# Patient Record
Sex: FEMALE | Race: WHITE | NOT HISPANIC OR LATINO | Employment: UNEMPLOYED | URBAN - METROPOLITAN AREA
[De-identification: names, ages, dates, MRNs, and addresses within clinical notes are randomized per-mention and may not be internally consistent; named-entity substitution may affect disease eponyms.]

---

## 2021-10-29 ENCOUNTER — HOSPITAL ENCOUNTER (EMERGENCY)
Facility: CLINIC | Age: 31
Discharge: HOME | End: 2021-10-29
Attending: EMERGENCY MEDICINE | Admitting: EMERGENCY MEDICINE
Payer: COMMERCIAL

## 2021-10-29 VITALS
SYSTOLIC BLOOD PRESSURE: 158 MMHG | TEMPERATURE: 97.8 F | RESPIRATION RATE: 18 BRPM | DIASTOLIC BLOOD PRESSURE: 84 MMHG | OXYGEN SATURATION: 99 % | HEART RATE: 101 BPM

## 2021-10-29 DIAGNOSIS — F10.920 ALCOHOLIC INTOXICATION WITHOUT COMPLICATION (H): Primary | ICD-10-CM

## 2021-10-29 DIAGNOSIS — R10.84 ABDOMINAL PAIN, GENERALIZED: ICD-10-CM

## 2021-10-29 LAB
ALBUMIN SERPL-MCNC: 3.2 G/DL (ref 3.4–5)
ALCOHOL BREATH TEST: 0.29 (ref 0–0.01)
ALP SERPL-CCNC: 84 U/L (ref 40–150)
ALT SERPL W P-5'-P-CCNC: 17 U/L (ref 0–50)
AMPHETAMINES UR QL SCN: ABNORMAL
ANION GAP SERPL CALCULATED.3IONS-SCNC: 7 MMOL/L (ref 3–14)
AST SERPL W P-5'-P-CCNC: 12 U/L (ref 0–45)
BARBITURATES UR QL: ABNORMAL
BASOPHILS # BLD AUTO: 0 10E3/UL (ref 0–0.2)
BASOPHILS NFR BLD AUTO: 0 %
BENZODIAZ UR QL: ABNORMAL
BILIRUB SERPL-MCNC: 0.2 MG/DL (ref 0.2–1.3)
BUN SERPL-MCNC: 7 MG/DL (ref 7–30)
CALCIUM SERPL-MCNC: 8.3 MG/DL (ref 8.5–10.1)
CANNABINOIDS UR QL SCN: ABNORMAL
CHLORIDE BLD-SCNC: 117 MMOL/L (ref 94–109)
CO2 SERPL-SCNC: 20 MMOL/L (ref 20–32)
COCAINE UR QL: ABNORMAL
CREAT SERPL-MCNC: 0.61 MG/DL (ref 0.52–1.04)
EOSINOPHIL # BLD AUTO: 0.1 10E3/UL (ref 0–0.7)
EOSINOPHIL NFR BLD AUTO: 1 %
ERYTHROCYTE [DISTWIDTH] IN BLOOD BY AUTOMATED COUNT: 12.9 % (ref 10–15)
GFR SERPL CREATININE-BSD FRML MDRD: >90 ML/MIN/1.73M2
GLUCOSE BLD-MCNC: 90 MG/DL (ref 70–99)
HCG UR QL: NEGATIVE
HCT VFR BLD AUTO: 41.2 % (ref 35–47)
HGB BLD-MCNC: 13.8 G/DL (ref 11.7–15.7)
IMM GRANULOCYTES # BLD: 0 10E3/UL
IMM GRANULOCYTES NFR BLD: 1 %
LIPASE SERPL-CCNC: 311 U/L (ref 73–393)
LYMPHOCYTES # BLD AUTO: 1.7 10E3/UL (ref 0.8–5.3)
LYMPHOCYTES NFR BLD AUTO: 23 %
MAGNESIUM SERPL-MCNC: 2.5 MG/DL (ref 1.6–2.3)
MCH RBC QN AUTO: 30.2 PG (ref 26.5–33)
MCHC RBC AUTO-ENTMCNC: 33.5 G/DL (ref 31.5–36.5)
MCV RBC AUTO: 90 FL (ref 78–100)
MONOCYTES # BLD AUTO: 0.2 10E3/UL (ref 0–1.3)
MONOCYTES NFR BLD AUTO: 3 %
NEUTROPHILS # BLD AUTO: 5.6 10E3/UL (ref 1.6–8.3)
NEUTROPHILS NFR BLD AUTO: 72 %
NRBC # BLD AUTO: 0 10E3/UL
NRBC BLD AUTO-RTO: 0 /100
OPIATES UR QL SCN: ABNORMAL
PLATELET # BLD AUTO: 287 10E3/UL (ref 150–450)
POTASSIUM BLD-SCNC: 3.5 MMOL/L (ref 3.4–5.3)
PROT SERPL-MCNC: 7.3 G/DL (ref 6.8–8.8)
RBC # BLD AUTO: 4.57 10E6/UL (ref 3.8–5.2)
SODIUM SERPL-SCNC: 144 MMOL/L (ref 133–144)
WBC # BLD AUTO: 7.7 10E3/UL (ref 4–11)

## 2021-10-29 PROCEDURE — 83690 ASSAY OF LIPASE: CPT | Performed by: STUDENT IN AN ORGANIZED HEALTH CARE EDUCATION/TRAINING PROGRAM

## 2021-10-29 PROCEDURE — 96361 HYDRATE IV INFUSION ADD-ON: CPT | Performed by: EMERGENCY MEDICINE

## 2021-10-29 PROCEDURE — 82040 ASSAY OF SERUM ALBUMIN: CPT | Performed by: STUDENT IN AN ORGANIZED HEALTH CARE EDUCATION/TRAINING PROGRAM

## 2021-10-29 PROCEDURE — 99283 EMERGENCY DEPT VISIT LOW MDM: CPT | Mod: 25 | Performed by: EMERGENCY MEDICINE

## 2021-10-29 PROCEDURE — 258N000003 HC RX IP 258 OP 636: Performed by: STUDENT IN AN ORGANIZED HEALTH CARE EDUCATION/TRAINING PROGRAM

## 2021-10-29 PROCEDURE — 96360 HYDRATION IV INFUSION INIT: CPT | Performed by: EMERGENCY MEDICINE

## 2021-10-29 PROCEDURE — 82075 ASSAY OF BREATH ETHANOL: CPT | Performed by: EMERGENCY MEDICINE

## 2021-10-29 PROCEDURE — 36415 COLL VENOUS BLD VENIPUNCTURE: CPT | Performed by: STUDENT IN AN ORGANIZED HEALTH CARE EDUCATION/TRAINING PROGRAM

## 2021-10-29 PROCEDURE — 99284 EMERGENCY DEPT VISIT MOD MDM: CPT | Performed by: EMERGENCY MEDICINE

## 2021-10-29 PROCEDURE — 80307 DRUG TEST PRSMV CHEM ANLYZR: CPT | Performed by: EMERGENCY MEDICINE

## 2021-10-29 PROCEDURE — 250N000009 HC RX 250: Performed by: STUDENT IN AN ORGANIZED HEALTH CARE EDUCATION/TRAINING PROGRAM

## 2021-10-29 PROCEDURE — 250N000011 HC RX IP 250 OP 636: Performed by: STUDENT IN AN ORGANIZED HEALTH CARE EDUCATION/TRAINING PROGRAM

## 2021-10-29 PROCEDURE — 83735 ASSAY OF MAGNESIUM: CPT | Performed by: STUDENT IN AN ORGANIZED HEALTH CARE EDUCATION/TRAINING PROGRAM

## 2021-10-29 PROCEDURE — 85025 COMPLETE CBC W/AUTO DIFF WBC: CPT | Performed by: STUDENT IN AN ORGANIZED HEALTH CARE EDUCATION/TRAINING PROGRAM

## 2021-10-29 PROCEDURE — 81025 URINE PREGNANCY TEST: CPT | Performed by: EMERGENCY MEDICINE

## 2021-10-29 PROCEDURE — 250N000013 HC RX MED GY IP 250 OP 250 PS 637: Performed by: STUDENT IN AN ORGANIZED HEALTH CARE EDUCATION/TRAINING PROGRAM

## 2021-10-29 RX ORDER — DIAZEPAM 5 MG
5-20 TABLET ORAL EVERY 30 MIN PRN
Status: DISCONTINUED | OUTPATIENT
Start: 2021-10-29 | End: 2021-10-30 | Stop reason: HOSPADM

## 2021-10-29 RX ORDER — ONDANSETRON 4 MG/1
4 TABLET, ORALLY DISINTEGRATING ORAL ONCE
Status: COMPLETED | OUTPATIENT
Start: 2021-10-29 | End: 2021-10-29

## 2021-10-29 RX ORDER — ACETAMINOPHEN 325 MG/1
650 TABLET ORAL ONCE
Status: COMPLETED | OUTPATIENT
Start: 2021-10-29 | End: 2021-10-29

## 2021-10-29 RX ADMIN — SODIUM CHLORIDE, POTASSIUM CHLORIDE, SODIUM LACTATE AND CALCIUM CHLORIDE 1000 ML: 600; 310; 30; 20 INJECTION, SOLUTION INTRAVENOUS at 19:23

## 2021-10-29 RX ADMIN — LIDOCAINE HYDROCHLORIDE 30 ML: 20 SOLUTION ORAL; TOPICAL at 19:23

## 2021-10-29 RX ADMIN — ACETAMINOPHEN 650 MG: 325 TABLET, FILM COATED ORAL at 18:45

## 2021-10-29 RX ADMIN — ONDANSETRON 4 MG: 4 TABLET, ORALLY DISINTEGRATING ORAL at 18:45

## 2021-10-29 NOTE — ED NOTES
Bed: HW01  Expected date:   Expected time:   Means of arrival:   Comments:  N715  31y F  Etoh, on hold

## 2021-10-29 NOTE — ED PROVIDER NOTES
"ED Provider Note  [facility]      History     Chief Complaint   Patient presents with     Alcohol Intoxication     HPI  Hilaria Mcnulty is a 31 year old female w/ a PMHx of MONICA and chronic abdominal pain s/p appendectomy in 2015 w/ resultant episodes of SBO who presents to the ED w/ a 2 day history of abdominal pain and alcohol intoxication.    States that her pain is a \"9/10\" stabbing in the lower abdomen, w/ associated pain w/ urination when she bears down. She endorses emesis x1 NB, NB around noon today; and her last BM last night that was \"painful, but not bloody\".     Patient presently denies EtOH usage. Reports only medicine she took is Advil for pain, and her prescribed Xanax / Paxil for anxiety. Patient does not immediately recall how she was brought here.     Past Medical History  History reviewed. No pertinent past medical history.  History reviewed. No pertinent surgical history.  acetaminophen 650 MG TABS  ALPRAZolam (XANAX PO)  gabapentin (NEURONTIN) 300 MG capsule  HYDROcodone-acetaminophen (NORCO) 5-325 MG per tablet  lactulose (CHRONULAC) 10 GM/15ML solution  lidocaine (LIDODERM) 5 % patch  ondansetron (ZOFRAN-ODT) 4 MG disintegrating tablet  PARoxetine (PAXIL) 30 MG tablet  polyethylene glycol (MIRALAX/GLYCOLAX) packet  senna-docusate (SENOKOT-S;PERICOLACE) 8.6-50 MG per tablet      No Known Allergies  Family History  History reviewed. No pertinent family history.  Social History   Social History     Tobacco Use     Smoking status: Never Smoker     Smokeless tobacco: Never Used   Substance Use Topics     Alcohol use: Yes     Drug use: Not Currently      Past medical history, past surgical history, medications, allergies, family history, and social history were reviewed with the patient. No additional pertinent items.       Review of Systems  A complete review of systems was performed with pertinent positives and negatives noted in the HPI, and all other systems " "negative.    Physical Exam   BP: 107/63  Pulse: 101  Temp: 97.5  F (36.4  C)  Resp: 20  SpO2: 95 %  Physical Exam  Constitutional:       General: She is not in acute distress.     Appearance: She is not ill-appearing or toxic-appearing.   Eyes:      General: Visual field deficit (Limited convergence. ) present.   Cardiovascular:      Rate and Rhythm: Normal rate and regular rhythm.      Pulses: Normal pulses.      Heart sounds: Normal heart sounds. No murmur heard.   No friction rub. No gallop.    Pulmonary:      Effort: Pulmonary effort is normal. No respiratory distress.      Breath sounds: Normal breath sounds. No wheezing or rales.   Abdominal:      General: Bowel sounds are normal. There is no distension.      Palpations: Abdomen is soft.      Tenderness: There is abdominal tenderness (reports stabbing pain on midabdomen / suprapubic palpation. ). There is no right CVA tenderness, left CVA tenderness, guarding or rebound. Negative signs include Rovsing's sign.      Hernia: No hernia is present.          Comments: Of note patient does not wince or guard against palpation and is able to talk w/ examiner w/ distraction.    Neurological:      Mental Status: She is alert. She is confused.       Psych  Appearance: Well groomed / hygenic. Dressed in back top and leggings. Tattoo on neck and right forearm. Wearing silver hoops and mask. Glitter noted on face.   Behavior: Attempts to answer questions but is often at a loss of thought, answers slowly w/ slight slurring, doesn't maintain consistent eye contact w/ interviewer, fixates on abdominal pain that is \"severe\", but is able to speak to examiner normally. Patient states pain on abdominal exam, but there is no guard, and with distraction patient is not actively reporting pain.    Speech: Slightly slurred, but tangible and understandable. Patience and tone are normal.   Mood: \"I am in pain\"  Emotive: Restricted, limited verbal emotive behaviors, some facial emotive " behaviors.   Psychomotor: Mild psychomotor retardation present   Thought Process: Logical, A->B direct, but is slow cognitively  Thought Content: Denies SI/HI. Not responding to internal stimuli.   Insight: Poor to fair, considering that she recognizes her chronic abdominal pain history and ties in her surgery and adhesion hx, but did deny EtOh usage.   Judgement: Poor to fair, states she sought treatment for abdominal pain, denies EtOH use yet has elevated breathalyzer values 3x upper limit.   Cognitive and Neuro: AAOx1 (self only). Short term memory impaired. Failed three word recall. Failed WORLD test. Demonstrated concrete thinking against abstract concepts. Diminsihed convergence noticed on accomodation/convergence ocular exam.     ED Course      Procedures            Results for orders placed or performed during the hospital encounter of 10/29/21   HCG qualitative urine     Status: Normal   Result Value Ref Range    hCG Urine Qualitative Negative Negative   Drug abuse screen 1 urine (ED)     Status: Abnormal   Result Value Ref Range    Amphetamines Urine Screen Negative Screen Negative    Barbiturates Urine Screen Negative Screen Negative    Benzodiazepines Urine Screen Positive (A) Screen Negative    Cannabinoids Urine Screen Negative Screen Negative    Cocaine Urine Screen Negative Screen Negative    Opiates Urine Screen Negative Screen Negative   Comprehensive metabolic panel     Status: Abnormal   Result Value Ref Range    Sodium 144 133 - 144 mmol/L    Potassium 3.5 3.4 - 5.3 mmol/L    Chloride 117 (H) 94 - 109 mmol/L    Carbon Dioxide (CO2) 20 20 - 32 mmol/L    Anion Gap 7 3 - 14 mmol/L    Urea Nitrogen 7 7 - 30 mg/dL    Creatinine 0.61 0.52 - 1.04 mg/dL    Calcium 8.3 (L) 8.5 - 10.1 mg/dL    Glucose 90 70 - 99 mg/dL    Alkaline Phosphatase 84 40 - 150 U/L    AST 12 0 - 45 U/L    ALT 17 0 - 50 U/L    Protein Total 7.3 6.8 - 8.8 g/dL    Albumin 3.2 (L) 3.4 - 5.0 g/dL    Bilirubin Total 0.2 0.2 - 1.3 mg/dL     GFR Estimate >90 >60 mL/min/1.73m2   Magnesium     Status: Abnormal   Result Value Ref Range    Magnesium 2.5 (H) 1.6 - 2.3 mg/dL   Lipase     Status: Normal   Result Value Ref Range    Lipase 311 73 - 393 U/L   CBC with platelets and differential     Status: None   Result Value Ref Range    WBC Count 7.7 4.0 - 11.0 10e3/uL    RBC Count 4.57 3.80 - 5.20 10e6/uL    Hemoglobin 13.8 11.7 - 15.7 g/dL    Hematocrit 41.2 35.0 - 47.0 %    MCV 90 78 - 100 fL    MCH 30.2 26.5 - 33.0 pg    MCHC 33.5 31.5 - 36.5 g/dL    RDW 12.9 10.0 - 15.0 %    Platelet Count 287 150 - 450 10e3/uL    % Neutrophils 72 %    % Lymphocytes 23 %    % Monocytes 3 %    % Eosinophils 1 %    % Basophils 0 %    % Immature Granulocytes 1 %    NRBCs per 100 WBC 0 <1 /100    Absolute Neutrophils 5.6 1.6 - 8.3 10e3/uL    Absolute Lymphocytes 1.7 0.8 - 5.3 10e3/uL    Absolute Monocytes 0.2 0.0 - 1.3 10e3/uL    Absolute Eosinophils 0.1 0.0 - 0.7 10e3/uL    Absolute Basophils 0.0 0.0 - 0.2 10e3/uL    Absolute Immature Granulocytes 0.0 <=0.0 10e3/uL    Absolute NRBCs 0.0 10e3/uL   Alcohol breath test POCT     Status: Abnormal   Result Value Ref Range    Alcohol Breath Test 0.288 (A) 0.00 - 0.01   Urine Drugs of Abuse Screen     Status: Abnormal    Narrative    The following orders were created for panel order Urine Drugs of Abuse Screen.  Procedure                               Abnormality         Status                     ---------                               -----------         ------                     Drug abuse screen 1 urin...[[id number]]  Abnormal            Final result                 Please view results for these tests on the individual orders.   CBC with platelets differential     Status: None    Narrative    The following orders were created for panel order CBC with platelets differential.  Procedure                               Abnormality         Status                     ---------                               -----------         ------   "                   CBC with platelets and d...[[id number]]                      Final result                 Please view results for these tests on the individual orders.     Medications   diazepam (VALIUM) tablet 5-20 mg (has no administration in time range)   lactated ringers BOLUS 1,000 mL (0 mLs Intravenous Stopped 10/29/21 2128)   ondansetron (ZOFRAN-ODT) ODT tab 4 mg (4 mg Oral Given 10/29/21 1845)   acetaminophen (TYLENOL) tablet 650 mg (650 mg Oral Given 10/29/21 1845)   lidocaine (XYLOCAINE) 2 % 15 mL, alum & mag hydroxide-simethicone (MAALOX) 15 mL GI Cocktail (30 mLs Oral Given 10/29/21 1923)        Assessments & Plan (with Medical Decision Making)   Hilaria Mcnulty is a 31 year old female w/ a PMHx of MONICA and chronic abdominal pain s/p appendectomy in 2015 w/ resultant episodes of SBO who presents to the ED w/ a 2 day history of abdominal pain and likely day alcohol intoxication. Denies EtOH use on admission, but has elevated levels of EtOH breath test - 0.288.    bHCG is negative. CBC is unremarkable. CMP remarkable for mild hyperchloremia 117. Magnesium was marginally elevated at 2.5. Lipase wnl.     LR 1000ml bolus, acetaminophen, and GI cocktail were initially administered in response to abdominal pain report in the setting of emesis.     Patient's symptoms improved during her ED course. She became more cognitive and alert during course and was able to admit she did drink today - stating last drink around 11AM of a \"mixed drink\". States she drank because her \"brother told me to get tattoos with him\" and \"got me drunk\". She states her brother called EMS to bring her in. States she wishes to leave now, and would call her brother or grandfather to pick to her up.     Patient did not require administration of diazepam during admit. Did not experience seizures / agitation. Repeat abdominal exam was benign and patient reported no pain. Patient reports an appointment on Nov 11 w/ a surgeon for abdominal " adhesion lysis. Patient was offered to meet with social work to engage and connect w/ support services, but patient declined at this time - citing family as supportive, and her new job as something she is looking forward to. Patient adamantly denies SI and HI.     Patient is stable and safe for home discharge. Patient will secure ride from family member.     Patient unable to secure transport from family at this time. Discussed options of staying overnight until family can pick her up. Patient preferred leaving now. Patient asked about calling taxi / uber for self. Patient is low risk for entering EtOH withdrawal as she has no EtOH hx (per her report). She is clinically sober. If patient is able to secure a ride (family, taxi) that can bring her home, this is amenable.     Discharge Medication List as of 10/29/2021 11:19 PM          Final diagnoses:   Alcoholic intoxication without complication (H)   Abdominal pain, generalized       --  Patient was discussed w/ staff physician Dr. Judah Olmos,   Family Medicine PGY1  [facility] EMERGENCY DEPARTMENT  10/29/2021      --    ED Attending Physician Attestation    I Jose Raul Johnson MD, cared for this patient with the Resident. I have performed a history and physical examination of the patient and discussed management with the resident. I reviewed the resident's documentation above and agree with the documented findings and plan of care.    Summary of HPI, PE, ED Course   Patient is a 31 year old female evaluated in the emergency department for alcohol intoxication and also complains of abdominal pain. Exam notable for benign abdomen and alcohol intoxication exam, stable vital signs ED course notable for normal laboratory studies including normal white count, not pregnant, normal LFTs.  She was monitored with serial neuro exams after which she clinically cleared, denies any abdominal pain notes that she is chronic abdominal pain in nature but  denies anything new or unusual.  Denies any new or acute symptoms.  Denies suicidal or homicidal ideation but does endorse significant anxiety which is led to her drinking tonight.  Offered mental health assessment the patient declined.  After the completion of care in the emergency department, the patient was discharged.    Critical Care & Procedures  Not applicable.    Medical Decision Making  The medical record was reviewed and interpreted.  Current labs reviewed and interpreted.      Jose Raul Johnson MD  Emergency Medicine          Jose Raul Johnson MD  10/30/21 0015

## 2021-10-29 NOTE — ED TRIAGE NOTES
Pt brought in by ambulance. JARROD of 0.288 upon arrival. Pt also reports abdominal pain, rates as a 10/10.

## 2021-10-30 NOTE — DISCHARGE INSTRUCTIONS
You were seen for abdominal pain and alcohol intoxication.     We discussed some resources for you, as you have been going thru a number of stressors recently. If you feel you need immediate help for crisis, you can text [place] to [phone].

## 2023-10-06 ENCOUNTER — HOSPITAL ENCOUNTER (EMERGENCY)
Facility: CLINIC | Age: 33
Discharge: HOME | End: 2023-10-06
Attending: FAMILY MEDICINE | Admitting: PSYCHIATRY & NEUROLOGY
Payer: COMMERCIAL

## 2023-10-06 ENCOUNTER — TELEPHONE (OUTPATIENT)
Dept: BEHAVIORAL HEALTH | Facility: CLINIC | Age: 33
End: 2023-10-06

## 2023-10-06 VITALS
HEART RATE: 92 BPM | HEIGHT: 65 IN | SYSTOLIC BLOOD PRESSURE: 131 MMHG | TEMPERATURE: 98.2 F | WEIGHT: 180 LBS | DIASTOLIC BLOOD PRESSURE: 87 MMHG | RESPIRATION RATE: 16 BRPM | OXYGEN SATURATION: 97 % | BODY MASS INDEX: 29.99 KG/M2

## 2023-10-06 DIAGNOSIS — F10.230 ALCOHOL DEPENDENCE WITH UNCOMPLICATED WITHDRAWAL (H): ICD-10-CM

## 2023-10-06 DIAGNOSIS — F13.20 BENZODIAZEPINE DEPENDENCE (H): ICD-10-CM

## 2023-10-06 DIAGNOSIS — F41.1 GENERALIZED ANXIETY DISORDER: ICD-10-CM

## 2023-10-06 PROBLEM — F33.9 RECURRENT MAJOR DEPRESSIVE DISORDER (H): Status: ACTIVE | Noted: 2023-10-06

## 2023-10-06 LAB
ALBUMIN SERPL BCG-MCNC: 4.2 G/DL (ref 3.5–5.2)
ALP SERPL-CCNC: 107 U/L (ref 35–104)
ALT SERPL W P-5'-P-CCNC: 13 U/L (ref 0–50)
AMPHETAMINES UR QL SCN: ABNORMAL
ANION GAP SERPL CALCULATED.3IONS-SCNC: 12 MMOL/L (ref 7–15)
AST SERPL W P-5'-P-CCNC: 14 U/L (ref 0–45)
BARBITURATES UR QL SCN: ABNORMAL
BASO+EOS+MONOS # BLD AUTO: ABNORMAL 10*3/UL
BASO+EOS+MONOS NFR BLD AUTO: ABNORMAL %
BASOPHILS # BLD AUTO: 0 10E3/UL (ref 0–0.2)
BASOPHILS NFR BLD AUTO: 0 %
BENZODIAZ UR QL SCN: ABNORMAL
BILIRUB SERPL-MCNC: 0.2 MG/DL
BUN SERPL-MCNC: 8 MG/DL (ref 6–20)
BZE UR QL SCN: ABNORMAL
CALCIUM SERPL-MCNC: 8.9 MG/DL (ref 8.6–10)
CANNABINOIDS UR QL SCN: ABNORMAL
CHLORIDE SERPL-SCNC: 104 MMOL/L (ref 98–107)
CREAT SERPL-MCNC: 0.64 MG/DL (ref 0.51–0.95)
DEPRECATED HCO3 PLAS-SCNC: 19 MMOL/L (ref 22–29)
EGFRCR SERPLBLD CKD-EPI 2021: >90 ML/MIN/1.73M2
EOSINOPHIL # BLD AUTO: 0 10E3/UL (ref 0–0.7)
EOSINOPHIL NFR BLD AUTO: 0 %
ERYTHROCYTE [DISTWIDTH] IN BLOOD BY AUTOMATED COUNT: 13.3 % (ref 10–15)
ETHANOL SERPL-MCNC: <0.01 G/DL
FENTANYL UR QL: ABNORMAL
GLUCOSE SERPL-MCNC: 93 MG/DL (ref 70–99)
HCG UR QL: NEGATIVE
HCT VFR BLD AUTO: 39.7 % (ref 35–47)
HGB BLD-MCNC: 13.1 G/DL (ref 11.7–15.7)
IMM GRANULOCYTES # BLD: 0 10E3/UL
IMM GRANULOCYTES NFR BLD: 0 %
LYMPHOCYTES # BLD AUTO: 1.7 10E3/UL (ref 0.8–5.3)
LYMPHOCYTES NFR BLD AUTO: 15 %
MCH RBC QN AUTO: 30.3 PG (ref 26.5–33)
MCHC RBC AUTO-ENTMCNC: 33 G/DL (ref 31.5–36.5)
MCV RBC AUTO: 92 FL (ref 78–100)
MONOCYTES # BLD AUTO: 0.5 10E3/UL (ref 0–1.3)
MONOCYTES NFR BLD AUTO: 4 %
NEUTROPHILS # BLD AUTO: 9.3 10E3/UL (ref 1.6–8.3)
NEUTROPHILS NFR BLD AUTO: 81 %
NRBC # BLD AUTO: 0 10E3/UL
NRBC BLD AUTO-RTO: 0 /100
OPIATES UR QL SCN: ABNORMAL
PCP QUAL URINE (ROCHE): ABNORMAL
PLATELET # BLD AUTO: 259 10E3/UL (ref 150–450)
POTASSIUM SERPL-SCNC: 4.3 MMOL/L (ref 3.4–5.3)
PROT SERPL-MCNC: 6.9 G/DL (ref 6.4–8.3)
RBC # BLD AUTO: 4.32 10E6/UL (ref 3.8–5.2)
SODIUM SERPL-SCNC: 135 MMOL/L (ref 135–145)
WBC # BLD AUTO: 11.5 10E3/UL (ref 4–11)

## 2023-10-06 PROCEDURE — 99283 EMERGENCY DEPT VISIT LOW MDM: CPT | Performed by: FAMILY MEDICINE

## 2023-10-06 PROCEDURE — 81025 URINE PREGNANCY TEST: CPT | Performed by: FAMILY MEDICINE

## 2023-10-06 PROCEDURE — 36415 COLL VENOUS BLD VENIPUNCTURE: CPT | Performed by: FAMILY MEDICINE

## 2023-10-06 PROCEDURE — 82077 ASSAY SPEC XCP UR&BREATH IA: CPT | Performed by: FAMILY MEDICINE

## 2023-10-06 PROCEDURE — 80307 DRUG TEST PRSMV CHEM ANLYZR: CPT | Performed by: FAMILY MEDICINE

## 2023-10-06 PROCEDURE — 99285 EMERGENCY DEPT VISIT HI MDM: CPT | Performed by: FAMILY MEDICINE

## 2023-10-06 PROCEDURE — 85004 AUTOMATED DIFF WBC COUNT: CPT | Performed by: FAMILY MEDICINE

## 2023-10-06 PROCEDURE — 99284 EMERGENCY DEPT VISIT MOD MDM: CPT | Performed by: PSYCHIATRY & NEUROLOGY

## 2023-10-06 PROCEDURE — 250N000013 HC RX MED GY IP 250 OP 250 PS 637: Performed by: EMERGENCY MEDICINE

## 2023-10-06 PROCEDURE — 80053 COMPREHEN METABOLIC PANEL: CPT | Performed by: FAMILY MEDICINE

## 2023-10-06 RX ORDER — LOPERAMIDE HCL 2 MG
2 CAPSULE ORAL 4 TIMES DAILY PRN
COMMUNITY
End: 2024-04-24

## 2023-10-06 RX ORDER — ALPRAZOLAM 1 MG
1 TABLET ORAL 3 TIMES DAILY
COMMUNITY

## 2023-10-06 RX ORDER — HYDROXYZINE HYDROCHLORIDE 50 MG/1
50 TABLET, FILM COATED ORAL ONCE
Status: COMPLETED | OUTPATIENT
Start: 2023-10-06 | End: 2023-10-06

## 2023-10-06 RX ORDER — LOPERAMIDE HCL 2 MG
2 CAPSULE ORAL 4 TIMES DAILY PRN
Status: CANCELLED | OUTPATIENT
Start: 2023-10-06

## 2023-10-06 RX ORDER — PHENOBARBITAL 64.8 MG/1
64.8 TABLET ORAL 3 TIMES DAILY
Status: CANCELLED | OUTPATIENT
Start: 2023-10-06

## 2023-10-06 RX ORDER — MAGNESIUM HYDROXIDE/ALUMINUM HYDROXICE/SIMETHICONE 120; 1200; 1200 MG/30ML; MG/30ML; MG/30ML
30 SUSPENSION ORAL EVERY 4 HOURS PRN
Status: CANCELLED | OUTPATIENT
Start: 2023-10-06

## 2023-10-06 RX ORDER — PAROXETINE 40 MG/1
40 TABLET, FILM COATED ORAL EVERY MORNING
Status: CANCELLED | OUTPATIENT
Start: 2023-10-07

## 2023-10-06 RX ORDER — PAROXETINE 20 MG/1
40 TABLET, FILM COATED ORAL EVERY MORNING
COMMUNITY

## 2023-10-06 RX ORDER — DROSPIRENONE AND ETHINYL ESTRADIOL 0.02-3(28)
1 KIT ORAL DAILY
COMMUNITY

## 2023-10-06 RX ORDER — AMOXICILLIN 250 MG
1 CAPSULE ORAL 2 TIMES DAILY PRN
Status: CANCELLED | OUTPATIENT
Start: 2023-10-06

## 2023-10-06 RX ORDER — ONDANSETRON 4 MG/1
4 TABLET, ORALLY DISINTEGRATING ORAL EVERY 6 HOURS PRN
Status: CANCELLED | OUTPATIENT
Start: 2023-10-06

## 2023-10-06 RX ORDER — ACETAMINOPHEN 500 MG
500-1000 TABLET ORAL EVERY 6 HOURS PRN
Status: CANCELLED | OUTPATIENT
Start: 2023-10-06

## 2023-10-06 RX ORDER — MULTIPLE VITAMINS W/ MINERALS TAB 9MG-400MCG
1 TAB ORAL DAILY
Status: CANCELLED | OUTPATIENT
Start: 2023-10-06

## 2023-10-06 RX ORDER — PHENOBARBITAL 32.4 MG/1
32.4 TABLET ORAL
Status: DISCONTINUED | OUTPATIENT
Start: 2023-10-06 | End: 2023-10-07 | Stop reason: HOSPADM

## 2023-10-06 RX ORDER — ACETAMINOPHEN 500 MG
500-1000 TABLET ORAL EVERY 6 HOURS PRN
COMMUNITY
End: 2024-04-24

## 2023-10-06 RX ORDER — FOLIC ACID 1 MG/1
1 TABLET ORAL DAILY
Status: CANCELLED | OUTPATIENT
Start: 2023-10-06

## 2023-10-06 RX ORDER — DROSPIRENONE AND ETHINYL ESTRADIOL 0.02-3(28)
1 KIT ORAL DAILY
Status: CANCELLED | OUTPATIENT
Start: 2023-10-06

## 2023-10-06 RX ORDER — DIAZEPAM 5 MG
5-20 TABLET ORAL EVERY 30 MIN PRN
Status: CANCELLED | OUTPATIENT
Start: 2023-10-06

## 2023-10-06 RX ORDER — ATENOLOL 50 MG/1
50 TABLET ORAL DAILY PRN
Status: CANCELLED | OUTPATIENT
Start: 2023-10-06

## 2023-10-06 RX ADMIN — HYDROXYZINE HYDROCHLORIDE 50 MG: 50 TABLET, FILM COATED ORAL at 20:03

## 2023-10-06 ASSESSMENT — ACTIVITIES OF DAILY LIVING (ADL)
ADLS_ACUITY_SCORE: 35
ADLS_ACUITY_SCORE: 35
ADLS_ACUITY_SCORE: 33
ADLS_ACUITY_SCORE: 35

## 2023-10-06 NOTE — DISCHARGE INSTRUCTIONS
Aftercare Plan  If I am feeling unsafe or I am in a crisis, I will:   Contact my established care providers   Call the National Suicide Prevention Lifeline: 311.740.1187   Go to the nearest emergency room   Call 911     Warning signs that I or other people might notice when a crisis is developing for me:     I am having increasing suicidal thoughts that turn to plans with intent or means  I am having additional urges to self-harm    My emotions are of hopelessness; feeling like there's no way out.  Rage or anger.  Engaging in risky activities without thinking  Withdrawing from family/friends  Dramatic mood swings  Drastic personality changes   Use of alcohol or drugs  Postings on social media  Neglect of personal hygiene or cares     Things I am able to do on my own to cope or help me feel better:    Spending quality time with loved ones  Staying hydrated  Eating balanced meals  Going for a walk every day  Take care of daily responsibilities/needs  Focus on positive self-talk vs negative self-talk    Things that I am able to do with others to cope or help me better:   Exercise  Music  Deep breathing  Meditations  Journal  Self-regulate  Self check-in  Ask for help    Things I can use or do for distraction:   Reach out to/spend time with family, friends  Shower  Exercise  Chores or do a project  Listen to music  Watch movie/TV  Listening to music  Journaling  Reading a book  Meditating  Call a friend    Changes I can make to support my mental health and wellness:    -I will abstain from all mood altering chemicals not currently prescribed to me   -I will attend scheduled mental health therapy and psychiatric appointments and follow all   recommendations  -I will commit to 30 minutes of self care daily - this can be as simple as taking a shower, going for a   walk, cooking a meal, read, writing, etc  -I will practice square breathing when I begin to feel anxious - in breath through the nose for the count   of 4 and  the first line on the square. Out breath through the mouth for the count of 4 for the second line   of the square. Repeat to complete the square. Repeat the square as many times as needed.  - I will use distraction skills of: going for walks, watching TV, spending time outside, calling a friend or   family member  -Use community resources, including [employer] numbers, [facility] crisis and support meetings  -Maintain a daily schedule/routine  -Practice deep breathing skills  -Download a meditation pedro and spend 15-20 minutes per day mediating/relaxing. Some apps to   download include: Calm, Headspace and Insight Timer. All 3 of these apps have free version    Reduce Extreme Emotion  QUICKLY:  Changing Your Body Chemistry      T:  Change your body Temperature to change your autonomic nervous system   Use Ice Water to calm yourself down FAST   Put your face in a bowl of ice water (this is the best way; have the person keep his/her face in ice water for 30-45 seconds - initial research is showing that the longer s/he can hold her/his face in the water, the better the response), or   Splash ice water on your face, or hold an ice pack on your face      I:  Intensely exercise to calm down a body revved up by emotion   Examples: running, walking fast, jumping, playing basketball, weight lifting, swimming, calisthenics, etc.   Engage in exercises that DO NOT include violent behaviors. Exercises that utilize violent behaviors tend to function as  behavioral rehearsal,  and rather than calming the person down, may actually  rev  the person up more, increasing the likelihood of violence, and lessening the likelihood that they will  burn off  energy     P:  Progressively relax your muscles   Starting with your hands, moving to your forearms, upper arms, shoulders, neck, forehead, eyes, cheeks and lips, tongue and teeth, chest, upper back, stomach, buttocks, thighs, calves, ankles, feet   Tense (10 seconds,   of the way), then relax  each muscle (all the way)   Notice the tension   Notice the difference when relaxed (by tensing first, and then relaxing, you are able to get a more thorough relaxation than by simply relaxing)      P: Paced breathing to relax   The standard technique is to begin with counting the number of steps one takes for a typical inhale, then counting the steps one takes for a typical exhale, and then lengthening the amount of steps for the exhalation by one or two steps.  OR  Repeat this pattern for 1-2 minutes  Inhale for four (4) seconds   Exhale for six (6) to eight (8) seconds   Research demonstrated that one can change one's overall level of anxiety by doing this exercise for even a few minutes per day      People in my life that I can ask for help:   Family  Friends  Providers    Your county has a mental health crisis team you can call 24/7:   [facility] Crisis Line Number: [phone]  [facility] Mental Health Crisis: [phone] - Call the crisis line for immediate mental health support, 24 hours a day.   [facility] Crisis Line Number: [phone]  [facility] Crisis Line Number: [phone]  [facility] Crisis Line Number: [phone]   [facility] Crisis Line Number: [phone]  [place]: [phone]  [place]: [phone]  [facility] Crisis Number: [phone]  [facility] Crisis: [phone]  [facility] Crisis: [phone]    Other things that are important when I'm in crisis:   Ask for help    Additional resources and information:     Mental Health Apps  My3  https://myAvaamopp.org/    VirtualHopeBox  https://[email or web address]/apps/virtual-hope-box/       Professionals or Agencies I Can Contact During A Crisis:       Crisis Lines  Call or Text 988 - National Suicide and Crisis Lifeline    Crisis Text Line  Text 885044  You will be connected with a trained live crisis counselor to provide support.    [employer] (LGBTQ Youth  "Crisis Line)  5.435.216.0122  text START to 577-114    National [place] on Mental Illness (SEBASTIEN)  680.875.6432 or 6.801.SEBASTIEN.HELPS    National Suicide Prevention Lifeline at 8-129-087-JOFP (9471)     Throughout  [place]: call **CRISIS (**631843)     Crisis Text Line: is available for free, 24/7 by texting [place] to 987060    Community Resources  Fast Tracker  Linking people to mental health and substance use disorder resources  SeniorLiving.Net.Errplane     [place] Mental Health Warm Line  Peer to peer support  Monday thru Saturday, 12 pm to 10 pm  295.093.8876 or 1.218.313.4619  Text \"Support\" to 80497     National [place] on Mental Illness (www.[place].sebastien.org): 220.245.3006 or 172-720-5750     [facility] Phone (free remote counseling): [phone] Web address:   https://[employer].org/     www.PlanStan (filter for insurance, gender preference, etc.)    [facility]   [phone]  Intake appointment will be virtual, following appointments can be in person or virtual.   **IMMEDIATE OPENINGS**    [facility]  [phone]  *offers individual therapy, medication management and Mental Health Case Workers; can self refer    [facility]  [phone]  *Immediate Openings    [facility]  [phone]  *Immediate Openings    [facility]  [phone]  *Immediate Openings    Please follow up with scheduled providers to ensure all necessary paperwork is filled out prior to your   scheduled telehealth appointments.     Coordinators from [facility] will be calling within two business days to ensure   that you have the resources you may need or provide assistance with scheduling (Phone number: [phone].).    Remember: give the referrals 3 sessions prior to calling it quits. Do you trust them? Do you feel   understood? Do you think they can help? Check in with yourself after each session    Please reach " out to the [facility]([phone]) regarding further mental health appointment needs for this emergency department visit.    [facility] SCHEDULING:  Today you were seen by a licensed mental health professional through [facility], [facility] ([facility])  for a crisis assessment in the Emergency Department at [facility].  It is recommended that you follow up with your estabished providers (psychiatrist, menta health therapist, and/or primary care doctor - as relevant) as soon as possible. Coordinators from [facility] will be calling you in the next 24-48 hours to ensure that you have the resources you need.  You can so contact [facility] coordinators directly at [phone].     [facility] maintains an extensive network of licensed behavioral health providers to connect patients with the services they need.  We do not charge providers a fee to participate in our referral network.  We match patients with providers based on a patient s specific needs, insurance coverage, and location.  Our first effort will be to refer you to a provider within your care system, and will utilize providers outside your care system as needed.        I recommend following up with your primary care provider to taper your Xanax at home.  Should you change your mind about detox you are welcome to return to us for reevaluation

## 2023-10-06 NOTE — ED TRIAGE NOTES
Patient is pleasant, calm, and cooperative. Patient's mom at bedside was extremely condescending to staff, belittling their ability to complete job duties immediately upon meeting.

## 2023-10-06 NOTE — PHARMACY-ADMISSION MEDICATION HISTORY
Pharmacist Admission Medication History    Admission medication history is complete. The information provided in this note is only as accurate as the sources available at the time of the update.    Information Source(s): Patient and CareEverywhere/SureScripts via in-person    Pertinent Information: Patient took morning and afternoon dose of Xanax today.    Changes made to PTA medication list:  Added: drospirenone-ethinyl estradiol, loperamide  Deleted: gabapentin, hydrocodone/acetaminophen, lactulose, lidocaine patch, polyethylene glycol, senna  Changed: updated paroxetine dosing    Allergies reviewed with patient and updates made in EHR: yes    Medication History Completed By: Maryellen Schumacher RPH 10/6/2023 4:51 PM    PTA Med List   Medication Sig Last Dose    acetaminophen (TYLENOL) 500 MG tablet Take 500-1,000 mg by mouth every 6 hours as needed for mild pain Unknown    ALPRAZolam (XANAX) 1 MG tablet Take 1 mg by mouth 3 times daily 10/6/2023    drospirenone-ethinyl estradiol (YUSUF) 3-0.02 MG tablet Take 1 tablet by mouth daily 10/6/2023    loperamide (IMODIUM) 2 MG capsule Take 2 mg by mouth 4 times daily as needed for diarrhea Past Week    ondansetron (ZOFRAN-ODT) 4 MG disintegrating tablet Take 1 tablet (4 mg) by mouth every 6 hours as needed for nausea More than a month    PARoxetine (PAXIL) 20 MG tablet Take 40 mg by mouth every morning 10/6/2023      Maryellen Schumacher, BrendaD

## 2023-10-06 NOTE — PLAN OF CARE
Hilaria Mcnulty  October 6, 2023  Plan of Care Hand-off Note     Patient Care Path: inpatient mental health (Detox)    Plan for Care:   After therapeutic assessment, intervention and aftercare planning by ED care team and [facility] and in consultation with attending provider, the patient's circumstances and mental state were appropriate for detox with an MICD bed. Pt denies SI, HI, NSSI, AH or VH. She feels she can be safe in the ED and on the detox unit. She does endorse ongoing symptoms of anxiety and depression and would benefit from individual therapy in the future to address these symptoms as well as her hx of trauma. She is agreeable to going to detox and interested in going to CD treatment to address her Xanax an alcohol use once she is ready to be discharged. She expressed interest in individual therapy once she has completed CD treatment, so resources have been included in her AVS to utilize when she is ready and this was explained to the patient. Also provided contact info for [facility] to assist with scheduling in the future if needed when she is ready for this step in her care.    Identified Goals and Safety Issues:      Overview:       Plan to go to detox unit, 3A      Josephine Mcnulty, mother, [phone]     Legal Status: Voluntary, [facility]    Psychiatry Consult: No, not at this time       Updated MD regarding plan of care.         ODELL Eisenberg

## 2023-10-06 NOTE — ED TRIAGE NOTES
Seeking Detox for xanax, takes 1 mg TID Also  Drinks 6 vodka shots with xanax daily. No H/O seizures. Last drink Sunday, last xanax 1 mg 12 PM today     Triage Assessment       Row Name 10/06/23 3228       Triage Assessment (Adult)    Airway WDL WDL       Respiratory WDL    Respiratory WDL WDL       Skin Circulation/Temperature WDL    Skin Circulation/Temperature WDL WDL       Cardiac WDL    Cardiac WDL WDL       Peripheral/Neurovascular WDL    Peripheral Neurovascular WDL WDL       Cognitive/Neuro/Behavioral WDL    Cognitive/Neuro/Behavioral WDL WDL

## 2023-10-06 NOTE — TELEPHONE ENCOUNTER
S: [facility] , Provider Dr. Nickerson calling at 2:48 PM  with clinical on a 33 year old/Female presenting for alcohol and benzodiazepine detox.     B: Pt presents for ETOH detox. Wants to detox from both alcohol and benzos. Has been on Xanax 3 mg/daily for years. She has started saying the prescription isn't addressing her symptom and mixing with alcohol. Will take 6 shots or so w/ alcohol. Treatment program told her she needs to get off both before they can take her. Doesn't know what benzo wd feels like since she's never been off them.    Currently reports drinking 6 shots/shooters a day for several months.    Patient reports last use was PTA.  Pt JARROD: 0.00  Pt  denies hx of DT  Pt  denies hx of seizures. Last seizure: N/A  Pt endorsing the following symptoms of withdrawal: Anxious, Perspiration , and Restless  MSSA Score: None    Pt endorses acute mental health or medical concerns. Anxiety  Pt denies other drug use: None Amount/frequency: N/A    Does Pt have a detox care plan in [facility]? No  Does pt present with specific needs, assistive devices, or exclusionary criteria? None  Is the patient ambulating, eating and drinking in the ED? Yes    A: Pt meets criteria to be presented for IP detox admission. Patient is voluntary    COVID Symptoms: No  If yes, COVID test required   Utox: Positive due to medication: Xanax  CMP: Abnormalities: CO 2 19, alkaline phosphatase 107  CBC: Abnormalities: WBC 11.5,Absolute neutrophils 9.3  HCG: Negative     R: Patient cleared and ready for behavioral bed placement: Yes    Pt is meeting criteria for presentation to 3A/CD    Does Patient need a Transfer Center request created? No, Pt is located within [facility] ED, [facility] ED, or [facility]     3:01 PM  Intake paged Dr. Bravo to review pt for 3A. Per Dr. Bravo, pt requires DEC consult, re: taking Xanax for her anxiety, detoxing from this benzodiazapine will affect her anxiety and she would like her assessed. Intake called the ED  to update Dr. Nickerson who will order DEC assessment.     5:53 PM  On Call Dr. Goel reviewing pt for 3A MICD.    7:31 PM  Pt accepted to 3A MICD with Dr. Bill. Intake called ED and 3A CRN Christina to update. Pt added to admit list and placed in the queue. All transfer items complete.

## 2023-10-06 NOTE — ED PROVIDER NOTES
[facility] [place] [facility] ([facility])    10/06/23      ED PROVIDER NOTE   ED 7 1:42 PM   History     Chief Complaint   Patient presents with    Addiction Problem    Drug / Alcohol Assessment     Seeking Detox for xanax, takes 1 mg TID Also  Drinks 6 vodka shots with xanax daily. No H/O seizures. Last drink Sunday, last xanax 1 mg 12 PM today     The history is provided by the patient, medical records and a parent.     Hilaria Mcnulty is a 33 year old female with history of asthma, panic attacks, generalized anxiety disorder, chronic abdominal pain, adhesions resulting in small bowel obstruction who presents to the emergency Department seeking detox from Xanax and alcohol.  Patient has been prescribed Xanax by her psychiatrist, Dr. Umana, since she was 20 years old.  Hasn't had a day without her Xanax.  She has been taking 1 mg of Xanax 3 times a day.  She states that she does take this as prescribed, but there are days where she is more anxious and takes an extra dose.  She has taken a total of 3 mg of Xanax today, last dose was 1 mg 12 PM today.  She states that she did not take them all at once, but did speed up the pace on her dosing as she was very anxious about coming into detox today. She also binge drinks alcohol, typically 7 shooters a day for few days in a row and then is able to stop drinking for several days.  She states that she last drank on Sunday (6 days prior).  However patient is accompanied by mother who disagrees with her report, states that she drinks more than she is reporting.  Mother states that when she goes on canela it is scary and she drinks to the point where she blacks out.  Mother states that patient has had negative consequences to her alcohol use, with a DWI a year ago.  Mother is concerned that she is not going to wake up someday. Patient is interested in detox and wants to get off of Xanax in entirety, but will need something for her anxiety.  Her mother is  "deeply, deeply concerned that we would \"throw her in a room\" and then she would withdraw and have a seizure.  Mother is hoping that patient will go to treatment immediately after detox.  No history of withdrawal seizures. No marijuana use, no other substance use.     Past Medical History  No past medical history on file.  No past surgical history on file.  acetaminophen 650 MG TABS  ALPRAZolam (XANAX PO)  gabapentin (NEURONTIN) 300 MG capsule  HYDROcodone-acetaminophen (NORCO) 5-325 MG per tablet  lactulose (CHRONULAC) 10 GM/15ML solution  lidocaine (LIDODERM) 5 % patch  ondansetron (ZOFRAN-ODT) 4 MG disintegrating tablet  PARoxetine (PAXIL) 30 MG tablet  polyethylene glycol (MIRALAX/GLYCOLAX) packet  senna-docusate (SENOKOT-S;PERICOLACE) 8.6-50 MG per tablet      No Known Allergies  Family History  No family history on file.  Social History   Social History     Tobacco Use    Smoking status: Never    Smokeless tobacco: Never   Substance Use Topics    Alcohol use: Yes    Drug use: Not Currently         A medically appropriate review of systems was performed with pertinent positives and negatives noted in the HPI, and all other systems negative.    Physical Exam   BP: 131/87  Pulse: 92  Temp: 98.2  F (36.8  C)  Resp: 16  Height: 165.1 cm (5' 5\")  Weight: 81.6 kg (180 lb)  SpO2: 97 %  Physical Exam  Vitals and nursing note reviewed.   Constitutional:       Appearance: She is well-developed.   HENT:      Head: Normocephalic and atraumatic.   Eyes:      Pupils: Pupils are equal, round, and reactive to light.   Neck:      Thyroid: No thyromegaly.      Trachea: No tracheal deviation.   Cardiovascular:      Rate and Rhythm: Normal rate and regular rhythm.      Heart sounds: Normal heart sounds. No murmur heard.     No friction rub. No gallop.   Pulmonary:      Effort: Pulmonary effort is normal.      Breath sounds: Normal breath sounds.   Chest:      Chest wall: No tenderness.   Abdominal:      General: Bowel sounds are " normal. There is no distension.      Palpations: Abdomen is soft. There is no mass.      Tenderness: There is no abdominal tenderness.   Musculoskeletal:         General: No tenderness.      Cervical back: Normal range of motion and neck supple.   Skin:     General: Skin is warm and dry.      Findings: No rash.   Neurological:      Mental Status: She is alert and oriented to person, place, and time.      Cranial Nerves: No cranial nerve deficit.      Coordination: Coordination normal.   Psychiatric:         Behavior: Behavior normal.           ED Course, Procedures, & Data     ED Course as of 10/06/23 1452   Fri Oct 06, 2023   1448 Urine Drugs of Abuse Screen No  Utox is on analyzer, should be done soon     Procedures           Results for orders placed or performed during the hospital encounter of 10/06/23   Comprehensive metabolic panel     Status: Abnormal   Result Value Ref Range    Sodium 135 135 - 145 mmol/L    Potassium 4.3 3.4 - 5.3 mmol/L    Carbon Dioxide (CO2) 19 (L) 22 - 29 mmol/L    Anion Gap 12 7 - 15 mmol/L    Urea Nitrogen 8.0 6.0 - 20.0 mg/dL    Creatinine 0.64 0.51 - 0.95 mg/dL    GFR Estimate >90 >60 mL/min/1.73m2    Calcium 8.9 8.6 - 10.0 mg/dL    Chloride 104 98 - 107 mmol/L    Glucose 93 70 - 99 mg/dL    Alkaline Phosphatase 107 (H) 35 - 104 U/L    AST 14 0 - 45 U/L    ALT 13 0 - 50 U/L    Protein Total 6.9 6.4 - 8.3 g/dL    Albumin 4.2 3.5 - 5.2 g/dL    Bilirubin Total 0.2 <=1.2 mg/dL   HCG qualitative urine (UPT)     Status: Normal   Result Value Ref Range    hCG Urine Qualitative Negative Negative   Ethyl Alcohol Level     Status: Normal   Result Value Ref Range    Alcohol ethyl <0.01 <=0.01 g/dL   CBC with platelets and differential     Status: Abnormal   Result Value Ref Range    WBC Count 11.5 (H) 4.0 - 11.0 10e3/uL    RBC Count 4.32 3.80 - 5.20 10e6/uL    Hemoglobin 13.1 11.7 - 15.7 g/dL    Hematocrit 39.7 35.0 - 47.0 %    MCV 92 78 - 100 fL    MCH 30.3 26.5 - 33.0 pg    MCHC 33.0 31.5 -  36.5 g/dL    RDW 13.3 10.0 - 15.0 %    Platelet Count 259 150 - 450 10e3/uL    % Neutrophils 81 %    % Lymphocytes 15 %    % Monocytes 4 %    Mids % (Monos, Eos, Basos)      % Eosinophils 0 %    % Basophils 0 %    % Immature Granulocytes 0 %    NRBCs per 100 WBC 0 <1 /100    Absolute Neutrophils 9.3 (H) 1.6 - 8.3 10e3/uL    Absolute Lymphocytes 1.7 0.8 - 5.3 10e3/uL    Absolute Monocytes 0.5 0.0 - 1.3 10e3/uL    Mids Abs (Monos, Eos, Basos)      Absolute Eosinophils 0.0 0.0 - 0.7 10e3/uL    Absolute Basophils 0.0 0.0 - 0.2 10e3/uL    Absolute Immature Granulocytes 0.0 <=0.4 10e3/uL    Absolute NRBCs 0.0 10e3/uL   Drug Abuse Screen Qual Urine     Status: Abnormal   Result Value Ref Range    Amphetamines Urine Screen Negative Screen Negative    Barbituates Urine Screen Negative Screen Negative    Benzodiazepine Urine Screen Positive (A) Screen Negative    Cannabinoids Urine Screen Negative Screen Negative    Cocaine Urine Screen Negative Screen Negative    Fentanyl Qual Urine Screen Negative Screen Negative    Opiates Urine Screen Negative Screen Negative    PCP Urine Screen Negative Screen Negative   CBC with platelets differential     Status: Abnormal    Narrative    The following orders were created for panel order CBC with platelets differential.  Procedure                               Abnormality         Status                     ---------                               -----------         ------                     CBC with platelets and d...[[id number]]  Abnormal            Final result                 Please view results for these tests on the individual orders.   Urine Drugs of Abuse Screen No     Status: Abnormal    Narrative    The following orders were created for panel order Urine Drugs of Abuse Screen No.  Procedure                               Abnormality         Status                     ---------                               -----------         ------                     Drug Abuse Screen Qual  U...[[id number]]  Abnormal            Final result                 Please view results for these tests on the individual orders.     Medications - No data to display  Labs Ordered and Resulted from Time of ED Arrival to Time of ED Departure   COMPREHENSIVE METABOLIC PANEL - Abnormal       Result Value    Sodium 135      Potassium 4.3      Carbon Dioxide (CO2) 19 (*)     Anion Gap 12      Urea Nitrogen 8.0      Creatinine 0.64      GFR Estimate >90      Calcium 8.9      Chloride 104      Glucose 93      Alkaline Phosphatase 107 (*)     AST 14      ALT 13      Protein Total 6.9      Albumin 4.2      Bilirubin Total 0.2     CBC WITH PLATELETS AND DIFFERENTIAL - Abnormal    WBC Count 11.5 (*)     RBC Count 4.32      Hemoglobin 13.1      Hematocrit 39.7      MCV 92      MCH 30.3      MCHC 33.0      RDW 13.3      Platelet Count 259      % Neutrophils 81      % Lymphocytes 15      % Monocytes 4      Mids % (Monos, Eos, Basos)        % Eosinophils 0      % Basophils 0      % Immature Granulocytes 0      NRBCs per 100 WBC 0      Absolute Neutrophils 9.3 (*)     Absolute Lymphocytes 1.7      Absolute Monocytes 0.5      Mids Abs (Monos, Eos, Basos)        Absolute Eosinophils 0.0      Absolute Basophils 0.0      Absolute Immature Granulocytes 0.0      Absolute NRBCs 0.0     DRUG ABUSE SCREEN QUAL URINE - Abnormal    Amphetamines Urine Screen Negative      Barbituates Urine Screen Negative      Benzodiazepine Urine Screen Positive (*)     Cannabinoids Urine Screen Negative      Cocaine Urine Screen Negative      Fentanyl Qual Urine Screen Negative      Opiates Urine Screen Negative      PCP Urine Screen Negative     HCG QUALITATIVE URINE - Normal    hCG Urine Qualitative Negative     ETHYL ALCOHOL LEVEL - Normal    Alcohol ethyl <0.01       No orders to display          Critical care was not performed.     Medical Decision Making  The patient's presentation was of high complexity (a chronic illness severe exacerbation,  progression, or side effect of treatment).    The patient's evaluation involved:  an assessment requiring an independent historian (family accompanies, provides collateral information)  review of external note(s) from 2 sources (reviewed prior discharge summary from [facility] November 2022 and prior ED visits July 2023 in [facility] and Care Everywhere)  ordering and/or review of 3+ test(s) in this encounter (see separate area of note for details)    The patient's management necessitated high risk (a decision regarding hospitalization).    Assessment & Plan    33-year-old female with a history of generalized anxiety disorder who has been on Xanax since the age of 20.  Unfortunately has started using alcohol, binge drinking, often to the point of blackout.  Patient has a plan to enter chemical dependency treatment but has been informed she must go to detox first.  Her last drink was several days ago, her alcohol level is 0.  She is already taken her full daily dose of Xanax by 1 PM in the afternoon.  Some days in which she takes slightly more than prescribed.  Labs obtained without significant metabolic abnormality.  Drug screen positive only for benzodiazepines as expected.  Alcohol level is 0.  Patient understands that she needs to completely stop alcohol and benzodiazepines, will agree to voluntary detox admission, is an appropriate candidate.  She is at high risk due to relatively high dose of prescribed benzodiazepines with concomitant severe alcohol abuse and dependence.    I have reviewed the nursing notes. I have reviewed the findings, diagnosis, plan and need for follow up with the patient.    New Prescriptions    No medications on file       Final diagnoses:   Benzodiazepine dependence (H)   Alcohol dependence with uncomplicated withdrawal (H)   Generalized anxiety disorder       I, Nieves Portillo, am serving as a trained medical scribe to document services personally performed by Christian Nickerson MD based on the  provider's statements to me on October 6, 2023.  This document has been checked and approved by the attending provider.    I, Christian Nickerson MD, was physically present and have reviewed and verified the accuracy of this note documented by Nieves Portillo, medical scribe.      Christian Nickerson MD     [facility] EMERGENCY DEPARTMENT  10/6/2023     Christian Nickerson MD  10/06/23 1457
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
## 2023-10-07 NOTE — ED PROVIDER NOTES
Assumed care from previous physician.  Patient this evening reported some anxiety.  We initially tried hydroxyzine to see if that would help with her symptoms.  She remained severely anxious and so I did order phenobarbital to deal with her Xanax withdrawal.  While we are waiting for the phenobarbital to the pharmacy the patient stated she no longer wanted assistance that go to detox.  I did go and explained to her the process and how phenobarbital would help with any withdrawal symptoms she experienced.  She stated that she was not interested in this.  As an alternative I recommended that upon discharge she continue taking her Xanax as prescribed.  She can contact her primary care provider on Monday and they can arrange a appropriate oral tapering of the Xanax.  Patient is not currently suicidal or homicidal.     Tito Driscoll, DO  10/06/23 [id number]

## 2023-10-07 NOTE — ED NOTES
Patient's mother called and was verbally aggressive towards writer over the phone, stating that she was promised that the patient would go up to the detox unit within hours of being in the Emergency Department. Writer explained the bed process to the patient and mother and how the Detox unit communicated they would not be able to take the patient until the night shift. Patient's mother screamed at writer over phone. Writer was unable to provide the mother an exact minute that the patient would arrive to the Detox unit so the mother requested to talk to reastor's supervisor. Writer communicated that they would be ending the interaction as it was verbally abusive and not productive. Charge nurse sent to talk to patient's mother.

## 2024-04-23 ENCOUNTER — HOSPITAL ENCOUNTER (INPATIENT)
Facility: CLINIC | Age: 34
LOS: 2 days | Discharge: LEFT BEFORE DISCHARGE | End: 2024-04-26
Attending: FAMILY MEDICINE | Admitting: PSYCHIATRY & NEUROLOGY
Payer: COMMERCIAL

## 2024-04-23 DIAGNOSIS — F10.229 ALCOHOL DEPENDENCE WITH INTOXICATION WITH COMPLICATION (H): ICD-10-CM

## 2024-04-23 DIAGNOSIS — F32.A DEPRESSION, UNSPECIFIED DEPRESSION TYPE: Primary | ICD-10-CM

## 2024-04-23 DIAGNOSIS — F41.9 ANXIETY: ICD-10-CM

## 2024-04-23 DIAGNOSIS — J45.20 MILD INTERMITTENT ASTHMA WITHOUT COMPLICATION: ICD-10-CM

## 2024-04-23 DIAGNOSIS — F10.20 ALCOHOL USE DISORDER, SEVERE, DEPENDENCE (H): ICD-10-CM

## 2024-04-23 DIAGNOSIS — F41.8 DEPRESSION WITH ANXIETY: ICD-10-CM

## 2024-04-23 LAB
ALCOHOL BREATH TEST: 0.29 (ref 0–0.01)
AMPHETAMINES UR QL SCN: ABNORMAL
BARBITURATES UR QL SCN: ABNORMAL
BASOPHILS # BLD AUTO: 0 10E3/UL (ref 0–0.2)
BASOPHILS NFR BLD AUTO: 1 %
BENZODIAZ UR QL SCN: ABNORMAL
BZE UR QL SCN: ABNORMAL
CANNABINOIDS UR QL SCN: ABNORMAL
EOSINOPHIL # BLD AUTO: 0 10E3/UL (ref 0–0.7)
EOSINOPHIL NFR BLD AUTO: 0 %
ERYTHROCYTE [DISTWIDTH] IN BLOOD BY AUTOMATED COUNT: 13.7 % (ref 10–15)
FENTANYL UR QL: ABNORMAL
HCG UR QL: NEGATIVE
HCT VFR BLD AUTO: 44.7 % (ref 35–47)
HGB BLD-MCNC: 14.7 G/DL (ref 11.7–15.7)
IMM GRANULOCYTES # BLD: 0 10E3/UL
IMM GRANULOCYTES NFR BLD: 1 %
LYMPHOCYTES # BLD AUTO: 3.2 10E3/UL (ref 0.8–5.3)
LYMPHOCYTES NFR BLD AUTO: 40 %
MCH RBC QN AUTO: 29.2 PG (ref 26.5–33)
MCHC RBC AUTO-ENTMCNC: 32.9 G/DL (ref 31.5–36.5)
MCV RBC AUTO: 89 FL (ref 78–100)
MONOCYTES # BLD AUTO: 0.4 10E3/UL (ref 0–1.3)
MONOCYTES NFR BLD AUTO: 5 %
NEUTROPHILS # BLD AUTO: 4.3 10E3/UL (ref 1.6–8.3)
NEUTROPHILS NFR BLD AUTO: 53 %
NRBC # BLD AUTO: 0 10E3/UL
NRBC BLD AUTO-RTO: 0 /100
OPIATES UR QL SCN: ABNORMAL
PCP QUAL URINE (ROCHE): ABNORMAL
PLATELET # BLD AUTO: 329 10E3/UL (ref 150–450)
RBC # BLD AUTO: 5.04 10E6/UL (ref 3.8–5.2)
WBC # BLD AUTO: 7.9 10E3/UL (ref 4–11)

## 2024-04-23 PROCEDURE — 93005 ELECTROCARDIOGRAM TRACING: CPT | Performed by: FAMILY MEDICINE

## 2024-04-23 PROCEDURE — 250N000011 HC RX IP 250 OP 636: Performed by: FAMILY MEDICINE

## 2024-04-23 PROCEDURE — 96365 THER/PROPH/DIAG IV INF INIT: CPT | Performed by: FAMILY MEDICINE

## 2024-04-23 PROCEDURE — 93010 ELECTROCARDIOGRAM REPORT: CPT | Performed by: FAMILY MEDICINE

## 2024-04-23 PROCEDURE — 82075 ASSAY OF BREATH ETHANOL: CPT | Performed by: FAMILY MEDICINE

## 2024-04-23 PROCEDURE — 36415 COLL VENOUS BLD VENIPUNCTURE: CPT | Performed by: FAMILY MEDICINE

## 2024-04-23 PROCEDURE — 83690 ASSAY OF LIPASE: CPT | Performed by: FAMILY MEDICINE

## 2024-04-23 PROCEDURE — 80053 COMPREHEN METABOLIC PANEL: CPT | Performed by: FAMILY MEDICINE

## 2024-04-23 PROCEDURE — 81025 URINE PREGNANCY TEST: CPT | Performed by: FAMILY MEDICINE

## 2024-04-23 PROCEDURE — HZ2ZZZZ DETOXIFICATION SERVICES FOR SUBSTANCE ABUSE TREATMENT: ICD-10-PCS | Performed by: PSYCHIATRY & NEUROLOGY

## 2024-04-23 PROCEDURE — 99285 EMERGENCY DEPT VISIT HI MDM: CPT | Mod: 25 | Performed by: FAMILY MEDICINE

## 2024-04-23 PROCEDURE — 82077 ASSAY SPEC XCP UR&BREATH IA: CPT | Performed by: FAMILY MEDICINE

## 2024-04-23 PROCEDURE — 85025 COMPLETE CBC W/AUTO DIFF WBC: CPT | Performed by: FAMILY MEDICINE

## 2024-04-23 PROCEDURE — 80307 DRUG TEST PRSMV CHEM ANLYZR: CPT | Performed by: FAMILY MEDICINE

## 2024-04-23 PROCEDURE — 83735 ASSAY OF MAGNESIUM: CPT | Performed by: FAMILY MEDICINE

## 2024-04-23 PROCEDURE — 250N000009 HC RX 250: Performed by: FAMILY MEDICINE

## 2024-04-23 RX ORDER — FOLIC ACID 1 MG/1
1 TABLET ORAL DAILY
Status: DISCONTINUED | OUTPATIENT
Start: 2024-04-24 | End: 2024-04-26 | Stop reason: HOSPADM

## 2024-04-23 RX ORDER — ATENOLOL 50 MG/1
50 TABLET ORAL DAILY PRN
Status: DISCONTINUED | OUTPATIENT
Start: 2024-04-23 | End: 2024-04-26 | Stop reason: HOSPADM

## 2024-04-23 RX ORDER — DIAZEPAM 5 MG
5-20 TABLET ORAL EVERY 30 MIN PRN
Status: DISCONTINUED | OUTPATIENT
Start: 2024-04-23 | End: 2024-04-26 | Stop reason: HOSPADM

## 2024-04-23 RX ORDER — MULTIPLE VITAMINS W/ MINERALS TAB 9MG-400MCG
1 TAB ORAL DAILY
Status: DISCONTINUED | OUTPATIENT
Start: 2024-04-24 | End: 2024-04-26 | Stop reason: HOSPADM

## 2024-04-23 RX ADMIN — FOLIC ACID: 5 INJECTION, SOLUTION INTRAMUSCULAR; INTRAVENOUS; SUBCUTANEOUS at 23:43

## 2024-04-23 ASSESSMENT — COLUMBIA-SUICIDE SEVERITY RATING SCALE - C-SSRS
6. HAVE YOU EVER DONE ANYTHING, STARTED TO DO ANYTHING, OR PREPARED TO DO ANYTHING TO END YOUR LIFE?: NO
2. HAVE YOU ACTUALLY HAD ANY THOUGHTS OF KILLING YOURSELF IN THE PAST MONTH?: NO
1. IN THE PAST MONTH, HAVE YOU WISHED YOU WERE DEAD OR WISHED YOU COULD GO TO SLEEP AND NOT WAKE UP?: NO

## 2024-04-23 ASSESSMENT — ACTIVITIES OF DAILY LIVING (ADL): ADLS_ACUITY_SCORE: 35

## 2024-04-24 ENCOUNTER — TELEPHONE (OUTPATIENT)
Dept: BEHAVIORAL HEALTH | Facility: CLINIC | Age: 34
End: 2024-04-24
Payer: COMMERCIAL

## 2024-04-24 PROBLEM — F41.8 DEPRESSION WITH ANXIETY: Status: ACTIVE | Noted: 2024-04-24

## 2024-04-24 PROBLEM — F10.10 ALCOHOL ABUSE: Status: ACTIVE | Noted: 2024-04-24

## 2024-04-24 PROBLEM — F10.229 ALCOHOL DEPENDENCE WITH INTOXICATION WITH COMPLICATION (H): Status: ACTIVE | Noted: 2024-04-24

## 2024-04-24 LAB
ALBUMIN SERPL BCG-MCNC: 4.4 G/DL (ref 3.5–5.2)
ALP SERPL-CCNC: 92 U/L (ref 40–150)
ALT SERPL W P-5'-P-CCNC: 23 U/L (ref 0–50)
ANION GAP SERPL CALCULATED.3IONS-SCNC: 19 MMOL/L (ref 7–15)
AST SERPL W P-5'-P-CCNC: 38 U/L (ref 0–45)
ATRIAL RATE - MUSE: 97 BPM
BILIRUB SERPL-MCNC: 0.2 MG/DL
BUN SERPL-MCNC: 8.6 MG/DL (ref 6–20)
CALCIUM SERPL-MCNC: 8.2 MG/DL (ref 8.6–10)
CHLORIDE SERPL-SCNC: 102 MMOL/L (ref 98–107)
CREAT SERPL-MCNC: 0.63 MG/DL (ref 0.51–0.95)
DEPRECATED HCO3 PLAS-SCNC: 20 MMOL/L (ref 22–29)
DIASTOLIC BLOOD PRESSURE - MUSE: NORMAL MMHG
EGFRCR SERPLBLD CKD-EPI 2021: >90 ML/MIN/1.73M2
ETHANOL SERPL-MCNC: 0.3 G/DL
GLUCOSE SERPL-MCNC: 97 MG/DL (ref 70–99)
INTERPRETATION ECG - MUSE: NORMAL
LIPASE SERPL-CCNC: 48 U/L (ref 13–60)
MAGNESIUM SERPL-MCNC: 2.1 MG/DL (ref 1.7–2.3)
P AXIS - MUSE: 22 DEGREES
POTASSIUM SERPL-SCNC: 3.9 MMOL/L (ref 3.4–5.3)
PR INTERVAL - MUSE: 128 MS
PROT SERPL-MCNC: 7.3 G/DL (ref 6.4–8.3)
QRS DURATION - MUSE: 78 MS
QT - MUSE: 372 MS
QTC - MUSE: 472 MS
R AXIS - MUSE: 11 DEGREES
SODIUM SERPL-SCNC: 141 MMOL/L (ref 135–145)
SYSTOLIC BLOOD PRESSURE - MUSE: NORMAL MMHG
T AXIS - MUSE: -2 DEGREES
VENTRICULAR RATE- MUSE: 97 BPM

## 2024-04-24 PROCEDURE — 250N000013 HC RX MED GY IP 250 OP 250 PS 637: Performed by: FAMILY MEDICINE

## 2024-04-24 PROCEDURE — 96375 TX/PRO/DX INJ NEW DRUG ADDON: CPT | Performed by: FAMILY MEDICINE

## 2024-04-24 PROCEDURE — 250N000011 HC RX IP 250 OP 636: Performed by: FAMILY MEDICINE

## 2024-04-24 PROCEDURE — 250N000011 HC RX IP 250 OP 636: Performed by: EMERGENCY MEDICINE

## 2024-04-24 PROCEDURE — 250N000013 HC RX MED GY IP 250 OP 250 PS 637: Performed by: NURSE PRACTITIONER

## 2024-04-24 PROCEDURE — 96372 THER/PROPH/DIAG INJ SC/IM: CPT | Performed by: FAMILY MEDICINE

## 2024-04-24 PROCEDURE — C9113 INJ PANTOPRAZOLE SODIUM, VIA: HCPCS | Performed by: FAMILY MEDICINE

## 2024-04-24 PROCEDURE — 250N000013 HC RX MED GY IP 250 OP 250 PS 637: Performed by: PSYCHIATRY & NEUROLOGY

## 2024-04-24 PROCEDURE — 258N000003 HC RX IP 258 OP 636: Performed by: FAMILY MEDICINE

## 2024-04-24 PROCEDURE — 96361 HYDRATE IV INFUSION ADD-ON: CPT | Performed by: FAMILY MEDICINE

## 2024-04-24 PROCEDURE — 128N000004 HC R&B CD ADULT

## 2024-04-24 RX ORDER — PHENOBARBITAL 64.8 MG/1
64.8 TABLET ORAL 3 TIMES DAILY
Status: DISCONTINUED | OUTPATIENT
Start: 2024-04-24 | End: 2024-04-26 | Stop reason: HOSPADM

## 2024-04-24 RX ORDER — ONDANSETRON 4 MG/1
4 TABLET, FILM COATED ORAL EVERY 6 HOURS PRN
Status: DISCONTINUED | OUTPATIENT
Start: 2024-04-24 | End: 2024-04-26 | Stop reason: HOSPADM

## 2024-04-24 RX ORDER — DIPHENHYDRAMINE HYDROCHLORIDE 50 MG/ML
50 INJECTION INTRAMUSCULAR; INTRAVENOUS ONCE
Status: COMPLETED | OUTPATIENT
Start: 2024-04-24 | End: 2024-04-24

## 2024-04-24 RX ORDER — AMOXICILLIN 250 MG
1 CAPSULE ORAL 2 TIMES DAILY PRN
Status: DISCONTINUED | OUTPATIENT
Start: 2024-04-24 | End: 2024-04-26 | Stop reason: HOSPADM

## 2024-04-24 RX ORDER — ONDANSETRON 4 MG/1
4 TABLET, ORALLY DISINTEGRATING ORAL ONCE
Status: COMPLETED | OUTPATIENT
Start: 2024-04-24 | End: 2024-04-24

## 2024-04-24 RX ORDER — PAROXETINE 20 MG/1
40 TABLET, FILM COATED ORAL DAILY
Status: DISCONTINUED | OUTPATIENT
Start: 2024-04-24 | End: 2024-04-26 | Stop reason: HOSPADM

## 2024-04-24 RX ORDER — METOCLOPRAMIDE HYDROCHLORIDE 5 MG/ML
5 INJECTION INTRAMUSCULAR; INTRAVENOUS ONCE
Status: COMPLETED | OUTPATIENT
Start: 2024-04-24 | End: 2024-04-24

## 2024-04-24 RX ORDER — LOPERAMIDE HCL 2 MG
2 CAPSULE ORAL 4 TIMES DAILY PRN
Status: DISCONTINUED | OUTPATIENT
Start: 2024-04-24 | End: 2024-04-26 | Stop reason: HOSPADM

## 2024-04-24 RX ORDER — HYDROXYZINE HYDROCHLORIDE 25 MG/1
25 TABLET, FILM COATED ORAL EVERY 4 HOURS PRN
Status: DISCONTINUED | OUTPATIENT
Start: 2024-04-24 | End: 2024-04-25

## 2024-04-24 RX ORDER — TRAZODONE HYDROCHLORIDE 50 MG/1
50 TABLET, FILM COATED ORAL
Status: DISCONTINUED | OUTPATIENT
Start: 2024-04-24 | End: 2024-04-25

## 2024-04-24 RX ORDER — MAGNESIUM HYDROXIDE/ALUMINUM HYDROXICE/SIMETHICONE 120; 1200; 1200 MG/30ML; MG/30ML; MG/30ML
30 SUSPENSION ORAL EVERY 4 HOURS PRN
Status: DISCONTINUED | OUTPATIENT
Start: 2024-04-24 | End: 2024-04-26 | Stop reason: HOSPADM

## 2024-04-24 RX ADMIN — ONDANSETRON 4 MG: 4 TABLET, ORALLY DISINTEGRATING ORAL at 05:54

## 2024-04-24 RX ADMIN — DIAZEPAM 5 MG: 5 TABLET ORAL at 05:54

## 2024-04-24 RX ADMIN — ALUMINUM HYDROXIDE, MAGNESIUM HYDROXIDE, AND SIMETHICONE 30 ML: 200; 200; 20 SUSPENSION ORAL at 20:14

## 2024-04-24 RX ADMIN — FOLIC ACID 1 MG: 1 TABLET ORAL at 09:40

## 2024-04-24 RX ADMIN — DIPHENHYDRAMINE HYDROCHLORIDE 50 MG: 50 INJECTION, SOLUTION INTRAMUSCULAR; INTRAVENOUS at 12:11

## 2024-04-24 RX ADMIN — DIAZEPAM 10 MG: 5 TABLET ORAL at 20:14

## 2024-04-24 RX ADMIN — HYDROXYZINE HYDROCHLORIDE 25 MG: 25 TABLET, FILM COATED ORAL at 22:13

## 2024-04-24 RX ADMIN — PANTOPRAZOLE SODIUM 40 MG: 40 INJECTION, POWDER, FOR SOLUTION INTRAVENOUS at 11:21

## 2024-04-24 RX ADMIN — SODIUM CHLORIDE 1000 ML: 9 INJECTION, SOLUTION INTRAVENOUS at 09:44

## 2024-04-24 RX ADMIN — METOCLOPRAMIDE HYDROCHLORIDE 5 MG: 5 INJECTION INTRAMUSCULAR; INTRAVENOUS at 09:46

## 2024-04-24 RX ADMIN — DIAZEPAM 10 MG: 5 TABLET ORAL at 17:35

## 2024-04-24 RX ADMIN — PROCHLORPERAZINE EDISYLATE 10 MG: 5 INJECTION INTRAMUSCULAR; INTRAVENOUS at 12:11

## 2024-04-24 RX ADMIN — DIAZEPAM 10 MG: 5 TABLET ORAL at 11:41

## 2024-04-24 RX ADMIN — DIAZEPAM 5 MG: 5 TABLET ORAL at 00:36

## 2024-04-24 RX ADMIN — DIAZEPAM 10 MG: 5 TABLET ORAL at 09:40

## 2024-04-24 RX ADMIN — DIAZEPAM 10 MG: 5 TABLET ORAL at 14:22

## 2024-04-24 RX ADMIN — LOPERAMIDE HYDROCHLORIDE 2 MG: 2 CAPSULE ORAL at 21:17

## 2024-04-24 RX ADMIN — BUPROPION HYDROCHLORIDE 40 MG: 150 TABLET, FILM COATED, EXTENDED RELEASE ORAL at 11:57

## 2024-04-24 RX ADMIN — THIAMINE HCL TAB 100 MG 100 MG: 100 TAB at 09:40

## 2024-04-24 RX ADMIN — LOPERAMIDE HYDROCHLORIDE 2 MG: 2 CAPSULE ORAL at 17:35

## 2024-04-24 RX ADMIN — Medication 1 TABLET: at 09:40

## 2024-04-24 RX ADMIN — PHENOBARBITAL 64.8 MG: 64.8 TABLET ORAL at 20:14

## 2024-04-24 ASSESSMENT — ACTIVITIES OF DAILY LIVING (ADL)
ADLS_ACUITY_SCORE: 35
ADLS_ACUITY_SCORE: 35
ADLS_ACUITY_SCORE: 28
ADLS_ACUITY_SCORE: 35
DRESSING/BATHING_DIFFICULTY: NO
WEAR_GLASSES_OR_BLIND: NO
ADLS_ACUITY_SCORE: 35
DRESS: SCRUBS (BEHAVIORAL HEALTH);INDEPENDENT
ADLS_ACUITY_SCORE: 28
ADLS_ACUITY_SCORE: 28
DOING_ERRANDS_INDEPENDENTLY_DIFFICULTY: NO
CONCENTRATING,_REMEMBERING_OR_MAKING_DECISIONS_DIFFICULTY: NO
ADLS_ACUITY_SCORE: 28
ORAL_HYGIENE: INDEPENDENT
TOILETING_ISSUES: NO
ADLS_ACUITY_SCORE: 35
ADLS_ACUITY_SCORE: 28
ADLS_ACUITY_SCORE: 35
ADLS_ACUITY_SCORE: 45
ADLS_ACUITY_SCORE: 35
DIFFICULTY_EATING/SWALLOWING: NO
ADLS_ACUITY_SCORE: 35
ADLS_ACUITY_SCORE: 35
HEARING_DIFFICULTY_OR_DEAF: NO
CHANGE_IN_FUNCTIONAL_STATUS_SINCE_ONSET_OF_CURRENT_ILLNESS/INJURY: NO
ADLS_ACUITY_SCORE: 35
WALKING_OR_CLIMBING_STAIRS_DIFFICULTY: NO
HYGIENE/GROOMING: INDEPENDENT
ADLS_ACUITY_SCORE: 35
ADLS_ACUITY_SCORE: 28
DIFFICULTY_COMMUNICATING: NO
FALL_HISTORY_WITHIN_LAST_SIX_MONTHS: NO
ADLS_ACUITY_SCORE: 35

## 2024-04-24 NOTE — MEDICATION SCRIBE - ADMISSION MEDICATION HISTORY
Medication Scribe Admission Medication History    Admission medication history is complete. The information provided in this note is only as accurate as the sources available at the time of the update.    Information Source(s): Patient, Hospital records, and CareEverywhere/SureScripts via in-person    Pertinent Information: None.    Changes made to PTA medication list:  Added: None  Deleted: acetaminophen 500-1000 mg Q 6 Hrs PRN, loperamide 2 mg QID PRN, Ondansetron 4 mg Q 6 Hrs PRN.  Changed: None    Allergies reviewed with patient and updates made in EHR: yes    Medication History Completed By: Chris Degroot MD 4/24/2024 2:49 PM    PTA Med List   Medication Sig Last Dose    ALPRAZolam (XANAX) 1 MG tablet Take 1 mg by mouth 3 times daily Past Week at 4/22/2024    drospirenone-ethinyl estradiol (YUSUF) 3-0.02 MG tablet Take 1 tablet by mouth daily 4/23/2024 at am    PARoxetine (PAXIL) 20 MG tablet Take 40 mg by mouth every morning 4/23/2024 at am

## 2024-04-24 NOTE — ED NOTES
Writer attempted to meet with patient via iPad. Per RN, patient became dysregulated and could not complete the virtual assessment. Patient will need to be seen in person at a later time for DEC assessment.     Bertin Núñez on 4/24/2024 at 6:55 AM

## 2024-04-24 NOTE — PROGRESS NOTES
04/24/24 6027   Patient Belongings   Did you bring any home meds/supplements to the hospital?  Yes   Disposition of meds  Other (see comment)  (Med bin)   Patient Belongings other (see comments)  (Storage bin and med bin)   Belongings Search Yes   Clothing Search Yes   Second Staff Paris and Seema performed the search.       Storage Bin: Blue slippers, blanket, black purse containing vaseline, bandages, prayer slips, keychain, black purse containing various make-up products and comb, wipes, ear ring, tampons, pads, and floss. Tan wallet w/ misc cards, loose change, receipt.     Med Bin: Phone, phone , earphones, security envelope #[id number]: $0.93 in cash and coins and 2 gold ear rings.  Security envelope #[id number]: $121 in cash, [place], [place], [place] 1260, visa 9190, urszula card    A               Admission:  I am responsible for any personal items that are not sent to the safe or pharmacy.  [facility] is not responsible for loss, theft or damage of any property in my possession.    Signature:  _________________________________ Date: _______  Time: _____                                              Staff Signature:  ____________________________ Date: ________  Time: _____      2nd Staff person, if patient is unable/unwilling to sign:    Signature: ________________________________ Date: ________  Time: _____     Discharge:  [facility] has returned all of my personal belongings:    Signature: _________________________________ Date: ________  Time: _____                                          Staff Signature:  ____________________________ Date: ________  Time: _____            Statement Selected

## 2024-04-24 NOTE — TELEPHONE ENCOUNTER
S: [facility] , Provider Jannette  calling at 9:15 AM   with clinical on a 33 year old/Female presenting for alcohol detox.     B: Pt presents for ETOH detox.   Currently reports drinking 1 liter of vodka daily  for 3 days - 4-5 drinks per day .    Patient reports last use was prior to arrival .  Pt JARROD: .3  Pt  denies hx of DT  Pt  denies hx of seizures. Last seizure: N/A  Pt endorsing the following symptoms of withdrawal: Shaky and Nausea  MSSA Score: 9 Valium at 6AM     Pt denies acute mental health or medical concerns.   Pt denies other drug use: None Amount/frequency: N/A    Does Pt have a detox care plan in [facility]? NO   Does pt present with specific needs, assistive devices, or exclusionary criteria? None  Is the patient ambulating, eating and drinking in the ED? Yes     A: Pt meets criteria to be presented for IP detox admission. Patient is voluntary    COVID Symptoms: No  If yes, COVID test required   Utox: Positive for Benzos Pt is prescribed xanax   Magnesium: WNL  CMP: WNL  CBC: WNL  HCG: N/A     R: Patient cleared and ready for behavioral bed placement: Yes    Pt is meeting criteria for presentation to 3A/MICD    Does Patient need a Transfer Center request created? No, Pt is located within [facility] ED, [facility] ED, or [place] ED     9:27 AM Paged Jose D to review for 3A / MICD/Fly   10:10 Accepted   10:57 AM Intake called 3A spoke with CRN lily Victor, pt will admit on evening shift.   12:33 PM Intake called ED with bed placement information.

## 2024-04-24 NOTE — ED TRIAGE NOTES
Pt denies intoxication, pt doesn't recall much about today, JARROD: 0.295     Triage Assessment (Adult)       Row Name 04/23/24 9278          Triage Assessment    Airway WDL WDL        Respiratory WDL    Respiratory WDL WDL        Cardiac WDL    Cardiac WDL X;rhythm     Pulse Rate & Regularity tachycardic

## 2024-04-24 NOTE — ED NOTES
4/23/2024  Hilaria Mcnulty 1990     Writer consulted with ED provider, Dr. Maxime Street,  on this date at  11:50 PM. It was determined that pt would not benefit from assessment at this time due to Pt unable able to participate in assessment    Writer will call ED after 5:30 AM to check on pt readiness.     Supa Salazar

## 2024-04-24 NOTE — ED NOTES
Pt reports her nausea has improved a little. She has a headache, and is having hot flashes. Pt is currently waiting for the DEC  via Ipad.

## 2024-04-24 NOTE — CONSULTS
Diagnostic Evaluation Consultation  Crisis Assessment    Patient Name: Hilaria Mcnulty  Age:  33 year old  Legal Sex: female  Gender Identity: female  Pronouns:   Race: White  Ethnicity: Not  or   Language: English      Patient was assessed: In person      Patient location: [facility] EMERGENCY DEPARTMENT                             ED07    Referral Data and Chief Complaint  Hilaria Mcnulty presents to the ED via EMS. Patient is presenting to the ED for the following concerns: Depression, Anxiety, Substance use.   Factors that make the mental health crisis life threatening or complex are:  Pt brought to the ED by EMS for evaluation of alcohol abuse and depression.  She arrived to the ED last night.  BAL was a .3@12am.  She has slept all night and is now sober.      Informed Consent and Assessment Methods  Explained the crisis assessment process, including applicable information disclosures and limits to confidentiality, assessed understanding of the process, and obtained consent to proceed with the assessment.  Assessment methods included conducting a formal interview with patient, review of medical records, collaboration with medical staff, and obtaining relevant collateral information from family and community providers when available.  : done     Patient response to interventions: acceptance expressed  Coping skills were attempted to reduce the crisis:  Has not been able to use coping skills     History of the Crisis   Pt with history of depression and alcohol abuse. She is seeking detox.  She says last night she decided she needed help to stop drinking.  She asked her mom to call 911.  She also reports depression and anxiety.  She denies suicidal or homicidal thoughts, plans,actions or intentions.  She has been drinking a liter of vodka per day for the last 3 days, prior to this she was drinking several times per week several drinks at a time, 4-5.   She is currently feeling nausea and  kori.  She is interested in getting sober and seeking some type of OP treatment.    Brief Psychosocial History  Family:  Single, Children no  Support System:  Parent(s), Sibling(s)  Employment Status:  unemployed  Source of Income:  none  Financial Environmental Concerns:  none  Current Hobbies:  music  Barriers in Personal Life:  lack of motivation  Lives with mom     Significant Clinical History  Current Anxiety Symptoms:  anxious  Current Depression/Trauma:  crying or feels like crying, sadness, impaired decision making, low self esteem  Current Somatic Symptoms:  excessive worry  Current Psychosis/Thought Disturbance:   (denies)  Current Eating Symptoms:   (denies)  Chemical Use History:  Alcohol: Daily  Last Use:: 04/24/24  Benzodiazepines: None  Opiates: None  Cocaine: None  Marijuana: None  Other Use: None  Withdrawal Symptoms: Tremors, Nausea (denies)  Addictions: Other (comment) (alcohol abuse)   Past diagnosis:  Anxiety Disorder, Depression, Substance Use Disorder  Family history:  Substance Use Disorder  Past treatment:  Primary Care  Details of most recent treatment:  Pt reports has has a PCP and a psychiatrist. She has seen her same psychiatrist since she was 20 yrs old. No hx of IP MH, detox, CD treatment or individual therapy. Compliant with her medication. Reports she went to a 90 program in the past specifically for her mental health, [facility], but reports this went terribly and she cried every day. She did not find it helpful or therapeutic and has made her worried to seek out help or treatment since.  Other relevant history:          Risk Assessment  Hickman Suicide Severity Rating Scale Full Clinical Version:  Suicidal Ideation  Q6 Suicide Behavior (Lifetime): no     Hickman Suicide Severity Rating Scale Recent:   Suicidal Ideation (Recent)  Q1 Wished to be Dead (Past Month): no  Q2 Suicidal Thoughts (Past Month): no  Level of Risk per Screen: no risks indicated     Suicidal Behavior  (Recent)  Actual Attempt (Past 3 Months): No  Has subject engaged in non-suicidal self-injurious behavior? (Past 3 Months): No  Interrupted Attempts (Past 3 Months): No  Aborted or Self-Interrupted Attempt (Past 3 Months): No  Preparatory Acts or Behavior (Past 3 Months): No    Environmental or Psychosocial Events: legal issues such as DWI, DUI, lawsuit, CPS involvement, etc.  Protective Factors: Protective Factors: strong bond to family unit, community support, or employment, lives in a responsibly safe and stable environment, sense of importance of health and wellness, able to access care without barriers, sense of belonging    Does the patient have thoughts of harming others? Feels Like Hurting Others: no  Previous Attempt to Hurt Others: no  Current presentation:  (calm and cooperative)    Is the patient engaging in sexually inappropriate behavior?           Mental Status Exam   Affect: Appropriate  Appearance: Appropriate  Attention Span/Concentration: Attentive  Eye Contact: Engaged    Fund of Knowledge: Appropriate   Language /Speech Content: Fluent  Language /Speech Volume: Normal  Language /Speech Rate/Productions: Normal  Recent Memory: Intact  Remote Memory: Intact  Mood: Normal, Sad  Orientation to Person: Yes   Orientation to Place: Yes  Orientation to Time of Day: Yes  Orientation to Date: Yes     Situation (Do they understand why they are here?): Yes  Psychomotor Behavior: Normal  Thought Content: Clear  Thought Form: Intact     Medication  Psychotropic medications:   Medication Orders - Psychiatric (From admission, onward)      Start     Dose/Rate Route Frequency Ordered Stop    04/23/24 2321  diazepam (VALIUM) tablet 5-20 mg         5-20 mg Oral EVERY 30 MIN PRN 04/23/24 2322               Current Care Team  Patient Care Team:  Jamison Raymundo NP as PCP - General Amarjit Umana MD (Tim) as Psychiatrist    Diagnosis  Patient Active Problem List   Diagnosis    Abdominal pain    Generalized anxiety  disorder    Recurrent major depressive disorder (H24)    Benzodiazepine dependence (H)    Alcohol abuse       Primary Problem This Admission  Active Hospital Problems    Alcohol abuse    Depression   Clinical Summary and Substantiation of Recommendations   Pt presents with alcohol abuse and depression.   She is seeking detox and is appropriate for this level of care.     Severe psychiatric, behavioral or other comorbid conditions are appropriate for management at inpatient mental health as indicated by at least one of the following: Comorbid substance use disorder  Severe dysfunction in daily living is present as indicated by at least one of the following: Other evidence of severe dysfunction  Situation and expectations are appropriate for inpatient care: Voluntary treatment at lower level of care is not feasible  Inpatient mental health services are necessary to meet patient needs and at least one of the following: Specific condition related to admission diagnosis is present and judged likely to further improve at proposed level of care      Patient coping skills attempted to reduce the crisis:  Has not been able to use coping skills    Disposition  Recommended disposition: Detox        Reviewed case and recommendations with attending provider. Attending Name: Dr Nickerson       Attending concurs with disposition: yes       Patient and/or validated legal guardian concurs with disposition:   yes       Final disposition: Detox  Clinical information given to Judi at Intake 4/24@0913    Legal status on admission: Voluntary/Patient has signed consent for treatment    Assessment Details   Total duration spent with the patient: 20 min     CPT code(s) utilized: Non-Billable    Susan Dalal [facility], Psychotherapist  DEC - Triage & Transition Services  Callback: [phone]

## 2024-04-24 NOTE — ED PROVIDER NOTES
Emergency Department Patient Sign-out       Brief HPI:  This is a 33 year old female signed out to me by Dr. Driscoll .  See initial ED Provider note for details of the presentation.          Patient is medically cleared for admission to a Behavioral Health unit.      The patient is not on a hold.      The patient has not required medication for agitation.    Awaiting Behavioral Health Assessment (DEC)      Significant Events prior to my assuming care: Patient was seen due to alcohol dependence and acute alcohol intoxication, but also complaining of severe depression and anxiety.  Was medically cleared for detox but no beds were available, however due to her severe depression and anxiety she is felt to qualify for dual diagnosis admission and thus is awaiting for DEC assessment prior to admission.  That assessment is still pending at the time I assumed care.      Exam:   Patient Vitals for the past 24 hrs:   BP Temp Temp src Pulse Resp SpO2   04/24/24 0530 (!) 133/95 -- -- 98 -- 95 %   04/24/24 0528 (!) 133/95 -- -- -- -- 96 %   04/24/24 0458 122/73 -- -- -- -- 96 %   04/24/24 0430 122/70 -- -- 103 -- 97 %   04/24/24 0400 122/79 -- -- 110 -- 96 %   04/24/24 0330 127/82 -- -- 99 -- 95 %   04/24/24 0256 122/70 -- -- -- -- 96 %   04/24/24 0232 130/88 -- -- 103 -- 96 %   04/24/24 0202 112/74 -- -- 106 -- 95 %   04/24/24 0130 (!) 122/90 -- -- 100 -- 96 %   04/24/24 0100 122/75 -- -- 103 -- 95 %   04/24/24 0034 -- -- -- -- -- 97 %   04/24/24 0032 -- -- -- -- -- 99 %   04/24/24 0030 129/83 -- -- 109 -- --   04/24/24 0001 130/81 -- -- 103 -- --   04/23/24 2348 (!) 135/94 -- -- 101 -- --   04/23/24 2242 -- -- -- -- -- 96 %   04/23/24 2241 (!) 143/81 98.2  F (36.8  C) Oral 100 16 96 %           ED RESULTS:   Results for orders placed or performed during the hospital encounter of 04/23/24 (from the past 24 hour(s))   Alcohol breath test POCT     Status: Abnormal    Collection Time: 04/23/24 10:56 PM   Result Value Ref  Range    Alcohol Breath Test 0.295 (A) 0.00 - 0.01   HCG qualitative urine (UPT)     Status: Normal    Collection Time: 04/23/24 11:23 PM   Result Value Ref Range    hCG Urine Qualitative Negative Negative   Urine Drug Screen     Status: Abnormal    Collection Time: 04/23/24 11:23 PM    Narrative    The following orders were created for panel order Urine Drug Screen.  Procedure                               Abnormality         Status                     ---------                               -----------         ------                     Urine Drug Screen Panel[[id number]]      Abnormal            Final result                 Please view results for these tests on the individual orders.   Urine Drug Screen Panel     Status: Abnormal    Collection Time: 04/23/24 11:23 PM   Result Value Ref Range    Amphetamines Urine Screen Negative Screen Negative    Barbituates Urine Screen Negative Screen Negative    Benzodiazepine Urine Screen Positive (A) Screen Negative    Cannabinoids Urine Screen Negative Screen Negative    Cocaine Urine Screen Negative Screen Negative    Fentanyl Qual Urine Screen Negative Screen Negative    Opiates Urine Screen Negative Screen Negative    PCP Urine Screen Negative Screen Negative   CBC with platelets differential     Status: None    Collection Time: 04/23/24 11:33 PM    Narrative    The following orders were created for panel order CBC with platelets differential.  Procedure                               Abnormality         Status                     ---------                               -----------         ------                     CBC with platelets and d...[[id number]]                      Final result                 Please view results for these tests on the individual orders.   Comprehensive metabolic panel     Status: Abnormal    Collection Time: 04/23/24 11:33 PM   Result Value Ref Range    Sodium 141 135 - 145 mmol/L    Potassium 3.9 3.4 - 5.3 mmol/L    Carbon Dioxide (CO2) 20  (L) 22 - 29 mmol/L    Anion Gap 19 (H) 7 - 15 mmol/L    Urea Nitrogen 8.6 6.0 - 20.0 mg/dL    Creatinine 0.63 0.51 - 0.95 mg/dL    GFR Estimate >90 >60 mL/min/1.73m2    Calcium 8.2 (L) 8.6 - 10.0 mg/dL    Chloride 102 98 - 107 mmol/L    Glucose 97 70 - 99 mg/dL    Alkaline Phosphatase 92 40 - 150 U/L    AST 38 0 - 45 U/L    ALT 23 0 - 50 U/L    Protein Total 7.3 6.4 - 8.3 g/dL    Albumin 4.4 3.5 - 5.2 g/dL    Bilirubin Total 0.2 <=1.2 mg/dL   Lipase     Status: Normal    Collection Time: 04/23/24 11:33 PM   Result Value Ref Range    Lipase 48 13 - 60 U/L   Ethyl Alcohol Level     Status: Abnormal    Collection Time: 04/23/24 11:33 PM   Result Value Ref Range    Alcohol ethyl 0.30 (H) <=0.01 g/dL   Magnesium     Status: Normal    Collection Time: 04/23/24 11:33 PM   Result Value Ref Range    Magnesium 2.1 1.7 - 2.3 mg/dL   CBC with platelets and differential     Status: None    Collection Time: 04/23/24 11:33 PM   Result Value Ref Range    WBC Count 7.9 4.0 - 11.0 10e3/uL    RBC Count 5.04 3.80 - 5.20 10e6/uL    Hemoglobin 14.7 11.7 - 15.7 g/dL    Hematocrit 44.7 35.0 - 47.0 %    MCV 89 78 - 100 fL    MCH 29.2 26.5 - 33.0 pg    MCHC 32.9 31.5 - 36.5 g/dL    RDW 13.7 10.0 - 15.0 %    Platelet Count 329 150 - 450 10e3/uL    % Neutrophils 53 %    % Lymphocytes 40 %    % Monocytes 5 %    % Eosinophils 0 %    % Basophils 1 %    % Immature Granulocytes 1 %    NRBCs per 100 WBC 0 <1 /100    Absolute Neutrophils 4.3 1.6 - 8.3 10e3/uL    Absolute Lymphocytes 3.2 0.8 - 5.3 10e3/uL    Absolute Monocytes 0.4 0.0 - 1.3 10e3/uL    Absolute Eosinophils 0.0 0.0 - 0.7 10e3/uL    Absolute Basophils 0.0 0.0 - 0.2 10e3/uL    Absolute Immature Granulocytes 0.0 <=0.4 10e3/uL    Absolute NRBCs 0.0 10e3/uL   EKG 12-lead, tracing only     Status: None (Preliminary result)    Collection Time: 04/23/24 11:39 PM   Result Value Ref Range    Systolic Blood Pressure  mmHg    Diastolic Blood Pressure  mmHg    Ventricular Rate 97 BPM    Atrial Rate  97 BPM    LA Interval 128 ms    QRS Duration 78 ms     ms    QTc 472 ms    P Axis 22 degrees    R AXIS 11 degrees    T Axis -2 degrees    Interpretation ECG       Sinus rhythm  T wave abnormality, consider anterior ischemia  Prolonged QT  Abnormal ECG         ED MEDICATIONS:   Medications   diazepam (VALIUM) tablet 5-20 mg (5 mg Oral $Given 4/24/24 0554)   atenolol (TENORMIN) tablet 50 mg (has no administration in time range)   thiamine (B-1) tablet 100 mg (has no administration in time range)   folic acid (FOLVITE) tablet 1 mg (has no administration in time range)   multivitamin w/minerals (THERA-VIT-M) tablet 1 tablet (has no administration in time range)   dextrose 5% and 0.45% NaCl 1,000 mL with Infuvite Adult 10 mL, thiamine 100 mg, folic acid 1 mg infusion ( Intravenous Stopped 4/24/24 0033)   ondansetron (ZOFRAN ODT) ODT tab 4 mg (4 mg Oral $Given 4/24/24 0554)         Impression:    ICD-10-CM    1. Alcohol dependence with intoxication with complication (H)  F10.229       2. Depression with anxiety  F41.8           Plan:    Pending studies include DEC assessment.    The patient was also seen by the BEC , please refer to their extensive note/evaluation which was reviewed with me and is documented in EPIC on 4/24/2024 for further details.  No longer reporting suicidal ideation.  Admits to some anxiety.  Patient has required Valium for elevated MSSA score and meets criteria for voluntary detox admission which she will consent to.  A bed is now available and has been requested.    MD Krishan Freire David, MD  04/24/24 7045

## 2024-04-24 NOTE — PROGRESS NOTES
"[facility] Admission Note    S = Situation:   Hilaria Mcnulty is a 33 year old year old female with a chief complaint of Alcohol Intoxication    Voluntary admission to [facility] for alcohol and benzodiazapine withdrawal and detox.    B  = Background:   Substance use history: Alcohol, Xanax (prescribed since 20 years old)     First start- alcohol, teenager      Amount-varies, but recently drinking 1/2 L of vodka     How often- daily     Last use- prior to coming to the ED yesterday, 4/23/24  Mental health history: Depression, anxiety  Medical history: ruptured appendix,  Legal history: Denies  Treatment history:  inpatient tx at a residential facility, [facility], which was a terrible experience.  Living situation: Lives with her mom and younger brother.   Recent life stressors: Her life situation, having to move in with her mom.      A  =  Assessment:   During admission interview, pt affect was tearful and anxious .  MSSA 11, Valium 10 mg given.  BP (!) 163/101   Pulse 97   Temp 97.5  F (36.4  C) (Temporal)   Resp 16   Ht 1.651 m (5' 5\")   Wt 86.2 kg (190 lb)   SpO2 96%   BMI 31.62 kg/m      Pt cooperative during intake, tearful at times and highly anxious. Pt stated she just moved in with her mom and brother about 1 month ago after living with her father. Pt stated she has been reflecting on her life and where it's going and is not happy with it.  Pt stated she went on a  \"horrible canela\" Sunday night through Tuesday until she got to the ED. Pt denies any street drugs or nicotine, but has been prescribed Xanax since the age of 20.     R =   Request or Recommendation:   Alcohol and benzodiazapine withdrawal will be monitored and treated using MSSA with valium and phenobarbital per protocol.  Pt will meet with psychiatry, internal medicine, and case management tomorrow.  At the time of admission, pt reports discharge plan is TBD, but expressed wanting to utilize AA resources. Pt's brother is sober and goes to a group " at a [employer] that she wants to attend, as well.

## 2024-04-24 NOTE — ED NOTES
4/23/2024  Hilaria Mcnulty 1990     Writer consulted with ED provider, Marilia Conte,  on this date at  5:50 AM. It was determined that pt would not benefit from assessment at this time due to Pt unable able to participate in assessment    ED will call [facility] at [phone] when pt is ready and able to participate in assessment.       Supa Salazar

## 2024-04-24 NOTE — ED PROVIDER NOTES
[facility] EMERGENCY DEPARTMENT ([facility])    4/23/24      ED PROVIDER NOTE    History     Chief Complaint   Patient presents with    Alcohol Intoxication     HPI  Hilaria Mcnulty is a 33 year old female with PMH notable for asthma, depression, anxiety, HLD who presents to the Emergency Department due to alcohol withdrawal as well as marked increase in her depression and anxiety patient is wanting both possible detox as well as help with her mental health situation.  Patient has chronic issues secondary to previous surgery 10 years ago with a ruptured appendix and subsequent complications she is continue to have chronic abdominal issues including previous obstructions but states now that her abdomen is not bothering her she is not currently having any symptoms.  Patient does attribute her depression and anxiety to that situation and also feels as though she has been treating it with alcohol use.  According to the patient's mother she goes on several day binges and does not shower does not take care of herself she is worried about patient's overall safety.  Patient admits that she is increasingly depressed but denies any specific suicidal ideation at this time.    Past Medical History  History reviewed. No pertinent past medical history.  History reviewed. No pertinent surgical history.  acetaminophen (TYLENOL) 500 MG tablet  ALPRAZolam (XANAX) 1 MG tablet  drospirenone-ethinyl estradiol (YUSUF) 3-0.02 MG tablet  loperamide (IMODIUM) 2 MG capsule  ondansetron (ZOFRAN-ODT) 4 MG disintegrating tablet  PARoxetine (PAXIL) 20 MG tablet      No Known Allergies  Family History  No family history on file.  Social History   Social History     Tobacco Use    Smoking status: Never    Smokeless tobacco: Never   Substance Use Topics    Alcohol use: Yes    Drug use: Not Currently         A complete review of systems was performed with pertinent positives and negatives noted in the HPI, and all other systems  negative.    Physical Exam   BP: (!) 143/81  Pulse: 100  Temp: 98.2  F (36.8  C)  Resp: 16  SpO2: 96 %  Physical Exam  Constitutional:       General: She is not in acute distress.     Appearance: Normal appearance. She is not diaphoretic.   HENT:      Head: Atraumatic.      Mouth/Throat:      Mouth: Mucous membranes are moist.   Eyes:      General: No scleral icterus.     Conjunctiva/sclera: Conjunctivae normal.   Cardiovascular:      Rate and Rhythm: Normal rate.      Heart sounds: Normal heart sounds.   Pulmonary:      Effort: No respiratory distress.      Breath sounds: Normal breath sounds.   Abdominal:      General: Abdomen is flat.      Tenderness: There is no abdominal tenderness. There is no guarding or rebound.   Musculoskeletal:      Cervical back: Neck supple.   Skin:     General: Skin is warm.      Findings: No rash.   Neurological:      General: No focal deficit present.      Mental Status: She is alert and oriented to person, place, and time.      Sensory: No sensory deficit.      Motor: No weakness.      Coordination: Coordination normal.   Psychiatric:         Mood and Affect: Mood is anxious and depressed. Affect is tearful.         Speech: Speech is slurred.         Behavior: Behavior is cooperative.         Thought Content: Thought content does not include homicidal or suicidal ideation.           ED Course, Procedures, & Data      Procedures          Results for orders placed or performed during the hospital encounter of 04/23/24   Alcohol breath test POCT     Status: Abnormal   Result Value Ref Range    Alcohol Breath Test 0.295 (A) 0.00 - 0.01     Medications   dextrose 5% and 0.45% NaCl 1,000 mL with Infuvite Adult 10 mL, thiamine 100 mg, folic acid 1 mg infusion (has no administration in time range)     Labs Ordered and Resulted from Time of ED Arrival to Time of ED Departure   ALCOHOL BREATH TEST POCT - Abnormal       Result Value    Alcohol Breath Test 0.295 (*)    COMPREHENSIVE METABOLIC  PANEL   LIPASE   ETHYL ALCOHOL LEVEL   MAGNESIUM   HCG QUALITATIVE URINE     No orders to display          Critical care was not performed.     Medical Decision Making  The patient's presentation was of high complexity (a chronic illness severe exacerbation, progression, or side effect of treatment).    The patient's evaluation involved:  ordering and/or review of 3+ test(s) in this encounter (see separate area of note for details)  discussion of management or test interpretation with another health professional (patient will be seen by independent practitioner with full DEC assessment and discussion of hospitalization and possible placement on station 3A for dual diagnosis)    The patient's management necessitated high risk (a decision regarding hospitalization).    Assessment & Plan        I have reviewed the nursing notes. I have reviewed the findings, diagnosis, plan and need for follow up with the patient.    Patient presenting with ongoing alcohol dependence acute intoxication requiring detox with likely supervised medical detox on station 3A also being evaluated for severe depression and anxiety she is not suicidal but may qualify for a dual diagnosis admission to station 3A there are currently no beds available on 3A and patient has not had full DEC assessment will remain in the emergency room tonight with full DEC assessment and subsequent potential admission to station 3A in the morning.    Final diagnoses:   Alcohol dependence with intoxication with complication (H)   Depression with anxiety         [facility] EMERGENCY DEPARTMENT  4/23/2024     Maxime Street MD  04/23/24 4880

## 2024-04-24 NOTE — ED NOTES
Pt called in a panic because she didn't want to speak with the Dec  at this moment any more. Pt stated she needs a little more time before she speaks with them. Supa in [facility] was called and updated.

## 2024-04-24 NOTE — ED NOTES
Bed: ED07  Expected date:   Expected time:   Means of arrival:   Comments:  Richmond Andersen 713 34 y/o F ETOH withdrawl

## 2024-04-25 LAB
ANION GAP SERPL CALCULATED.3IONS-SCNC: 16 MMOL/L (ref 7–15)
BUN SERPL-MCNC: 4.7 MG/DL (ref 6–20)
CALCIUM SERPL-MCNC: 9.2 MG/DL (ref 8.6–10)
CHLORIDE SERPL-SCNC: 107 MMOL/L (ref 98–107)
CHOLEST SERPL-MCNC: 225 MG/DL
CREAT SERPL-MCNC: 0.58 MG/DL (ref 0.51–0.95)
DEPRECATED HCO3 PLAS-SCNC: 19 MMOL/L (ref 22–29)
EGFRCR SERPLBLD CKD-EPI 2021: >90 ML/MIN/1.73M2
GGT SERPL-CCNC: 31 U/L (ref 5–36)
GLUCOSE SERPL-MCNC: 96 MG/DL (ref 70–99)
HDLC SERPL-MCNC: 51 MG/DL
LDLC SERPL CALC-MCNC: 125 MG/DL
NONHDLC SERPL-MCNC: 174 MG/DL
POTASSIUM SERPL-SCNC: 4.5 MMOL/L (ref 3.4–5.3)
SODIUM SERPL-SCNC: 142 MMOL/L (ref 135–145)
T4 FREE SERPL-MCNC: 1.5 NG/DL (ref 0.9–1.7)
TRIGL SERPL-MCNC: 244 MG/DL
TSH SERPL DL<=0.005 MIU/L-ACNC: 5.37 UIU/ML (ref 0.3–4.2)

## 2024-04-25 PROCEDURE — 82465 ASSAY BLD/SERUM CHOLESTEROL: CPT | Performed by: NURSE PRACTITIONER

## 2024-04-25 PROCEDURE — 250N000013 HC RX MED GY IP 250 OP 250 PS 637: Performed by: NURSE PRACTITIONER

## 2024-04-25 PROCEDURE — 99222 1ST HOSP IP/OBS MODERATE 55: CPT | Performed by: PHYSICIAN ASSISTANT

## 2024-04-25 PROCEDURE — 128N000004 HC R&B CD ADULT

## 2024-04-25 PROCEDURE — 250N000013 HC RX MED GY IP 250 OP 250 PS 637: Performed by: FAMILY MEDICINE

## 2024-04-25 PROCEDURE — 80048 BASIC METABOLIC PNL TOTAL CA: CPT | Performed by: PHYSICIAN ASSISTANT

## 2024-04-25 PROCEDURE — 84443 ASSAY THYROID STIM HORMONE: CPT | Performed by: NURSE PRACTITIONER

## 2024-04-25 PROCEDURE — 84439 ASSAY OF FREE THYROXINE: CPT | Performed by: NURSE PRACTITIONER

## 2024-04-25 PROCEDURE — 99222 1ST HOSP IP/OBS MODERATE 55: CPT | Performed by: NURSE PRACTITIONER

## 2024-04-25 PROCEDURE — 93005 ELECTROCARDIOGRAM TRACING: CPT

## 2024-04-25 PROCEDURE — 36416 COLLJ CAPILLARY BLOOD SPEC: CPT | Performed by: NURSE PRACTITIONER

## 2024-04-25 PROCEDURE — 250N000013 HC RX MED GY IP 250 OP 250 PS 637: Performed by: PSYCHIATRY & NEUROLOGY

## 2024-04-25 PROCEDURE — 82977 ASSAY OF GGT: CPT | Performed by: NURSE PRACTITIONER

## 2024-04-25 PROCEDURE — 93010 ELECTROCARDIOGRAM REPORT: CPT | Performed by: INTERNAL MEDICINE

## 2024-04-25 RX ORDER — DROSPIRENONE AND ETHINYL ESTRADIOL 0.02-3(28)
1 KIT ORAL DAILY
Status: DISCONTINUED | OUTPATIENT
Start: 2024-04-25 | End: 2024-04-26 | Stop reason: HOSPADM

## 2024-04-25 RX ORDER — HYDROXYZINE HYDROCHLORIDE 25 MG/1
25-50 TABLET, FILM COATED ORAL EVERY 4 HOURS PRN
Status: DISCONTINUED | OUTPATIENT
Start: 2024-04-25 | End: 2024-04-26 | Stop reason: HOSPADM

## 2024-04-25 RX ORDER — HYDRALAZINE HYDROCHLORIDE 10 MG/1
10 TABLET, FILM COATED ORAL EVERY 6 HOURS PRN
Status: DISCONTINUED | OUTPATIENT
Start: 2024-04-25 | End: 2024-04-26 | Stop reason: HOSPADM

## 2024-04-25 RX ORDER — TRAZODONE HYDROCHLORIDE 100 MG/1
100 TABLET ORAL
Status: DISCONTINUED | OUTPATIENT
Start: 2024-04-25 | End: 2024-04-26 | Stop reason: HOSPADM

## 2024-04-25 RX ORDER — ALBUTEROL SULFATE 90 UG/1
2 AEROSOL, METERED RESPIRATORY (INHALATION) EVERY 6 HOURS PRN
Status: DISCONTINUED | OUTPATIENT
Start: 2024-04-25 | End: 2024-04-26 | Stop reason: HOSPADM

## 2024-04-25 RX ORDER — ACETAMINOPHEN 325 MG/1
650 TABLET ORAL EVERY 4 HOURS PRN
Status: DISCONTINUED | OUTPATIENT
Start: 2024-04-25 | End: 2024-04-26 | Stop reason: HOSPADM

## 2024-04-25 RX ADMIN — PHENOBARBITAL 64.8 MG: 64.8 TABLET ORAL at 15:04

## 2024-04-25 RX ADMIN — PHENOBARBITAL 64.8 MG: 64.8 TABLET ORAL at 20:37

## 2024-04-25 RX ADMIN — TRAZODONE HYDROCHLORIDE 100 MG: 100 TABLET ORAL at 20:10

## 2024-04-25 RX ADMIN — FOLIC ACID 1 MG: 1 TABLET ORAL at 09:02

## 2024-04-25 RX ADMIN — BUPROPION HYDROCHLORIDE 40 MG: 150 TABLET, FILM COATED, EXTENDED RELEASE ORAL at 09:02

## 2024-04-25 RX ADMIN — PHENOBARBITAL 64.8 MG: 64.8 TABLET ORAL at 09:02

## 2024-04-25 RX ADMIN — DROSPIRENONE AND ETHINYL ESTRADIOL 1 TABLET: KIT at 10:35

## 2024-04-25 RX ADMIN — DIAZEPAM 10 MG: 5 TABLET ORAL at 16:26

## 2024-04-25 RX ADMIN — ACETAMINOPHEN 650 MG: 325 TABLET, FILM COATED ORAL at 16:26

## 2024-04-25 RX ADMIN — DIAZEPAM 10 MG: 5 TABLET ORAL at 00:59

## 2024-04-25 RX ADMIN — HYDROXYZINE HYDROCHLORIDE 25 MG: 25 TABLET, FILM COATED ORAL at 12:23

## 2024-04-25 RX ADMIN — THIAMINE HCL TAB 100 MG 100 MG: 100 TAB at 09:02

## 2024-04-25 RX ADMIN — ACETAMINOPHEN 650 MG: 325 TABLET, FILM COATED ORAL at 12:24

## 2024-04-25 RX ADMIN — TRAZODONE HYDROCHLORIDE 50 MG: 50 TABLET ORAL at 03:21

## 2024-04-25 RX ADMIN — DIAZEPAM 10 MG: 5 TABLET ORAL at 03:25

## 2024-04-25 RX ADMIN — Medication 1 TABLET: at 09:02

## 2024-04-25 ASSESSMENT — ACTIVITIES OF DAILY LIVING (ADL)
LAUNDRY: WITH SUPERVISION
ADLS_ACUITY_SCORE: 28
ADLS_ACUITY_SCORE: 28
ORAL_HYGIENE: INDEPENDENT
ADLS_ACUITY_SCORE: 28
ORAL_HYGIENE: INDEPENDENT
ADLS_ACUITY_SCORE: 28
DRESS: SCRUBS (BEHAVIORAL HEALTH);INDEPENDENT
ADLS_ACUITY_SCORE: 28
HYGIENE/GROOMING: INDEPENDENT
ADLS_ACUITY_SCORE: 28
DRESS: SCRUBS (BEHAVIORAL HEALTH)
ADLS_ACUITY_SCORE: 28
ADLS_ACUITY_SCORE: 28
HYGIENE/GROOMING: INDEPENDENT
ADLS_ACUITY_SCORE: 28

## 2024-04-25 NOTE — PLAN OF CARE
"Goal Outcome Evaluation:    Plan of Care Reviewed With: patient Plan of Care Reviewed With: patient    Overall Patient Progress: improvingOverall Patient Progress: improving    Outcome Evaluation: pt in alcohol and benzo withdrawal monitoring, improving overall    MSSA scores 7 and 7 today. No valium was administered this shift. Pt has no visibly apparent tremors and feeling \"hot\" but no apparent sweating. She finished 75% of breakfast due to food preferences and denies hallucinations. Score is mainly from high pulses of 100 and 116. No atenolol administered due to history of asthma. Total valium administered since arrival to the hospital is 80mg (40mg in the ED, 40mg on 3A). Last valium administered on 4/25/2024 at 0325.    Pt endorses anxiety 7/10 and depression 5/10. Reports sadness and feeling \"overwhelmed being here\". She stated that her main trigger was financial stressors. Denies suicidal ideation plans or intent. PRN hydroxyzine was administered x1 for anxiety.    PRN tylenol was ordered and administered x1 for headache.    Pt plan is to attend [facility] [employer] that was recommended by her brother who is now sober. Educated on naltrexone, campral, and antabuse, but not interested at this time. Will continue to monitor and intervene as warranted.  "

## 2024-04-25 NOTE — PROGRESS NOTES
"[facility]     Case Management Encounter: Writer met with patient to discuss aftercare plans. Patient reports this is her first time in detox. Patient states \"I have a loving supportive family, I know I can get through this\".     Insurance: [facility] [facility]     Legal Status: vol     SUDs Assessment Status: does not need unless she requests residential     ROIs on file: none     Living Situation: Patient states she recently moved home with her mom in [place]     Current Providers, Supports & Collateral:  Patient states she has been seeing the same psychiatry provider since childhood and is interested in mental health therapy. Patient declined writers offer of setting up therapy while on the detox unit and prefers to follow up on her own with a program near her home.     Current Plan/Referral Status: Detox only. Patient plans to follow up with scheduling herself therapy at [facility] near her house.      RN updated.    Felicity De La Rosa  [address] - Adult Inpatient Addiction Psychiatry Unit           "

## 2024-04-25 NOTE — DISCHARGE INSTRUCTIONS
RESOURCES    Recommendation:  If you are unable to maintain sobriety in the community then please obtain a chemical health assessment and follow all recommendations.  To schedule an assessment:  Call the [facility] assessment center at [phone] and ask to schedule a Chemical Health Assessment.     You can also call your county Chemical Dependency unit (these are usually included under adult mental health services). Most [facility] have a number for you to call to schedule an assessment.  If you have private insurance or a PMAP you can call the customer service number on your insurance  card and they can give you a list of places to call for an assessment that are in network with your  insurance.       To find a treatment program:     [facility]  Click on find treatment  Enter your zip code and select your city/state.  The Substance Abuse and Mental Health Services Administration (SAMHSA) is the agency within  the U.S. Department of Health and Human Services that leads public health efforts to advance the  behavioral health of the nation. SAMHSA's mission is to reduce the impact of substance abuse and  mental illness on [place]'s communities.     [employer].org select substance use disorder programs then select find services.  Fast Tracker is a virtual community and health care connection resource. We connect  individuals, families, mental health and substance use disorder providers, physicians, care coordinators,  and others with a real-time, searchable directory of mental health and substance use disorder resources  and their availability within [place].    Facts about COVID19 at www.cdc.gov/COVID19 and www.[place].gov/covid19    Keeping hands clean is one of the most important steps we can take to avoid getting sick and spreading germs to others.  Please wash your hands frequently and lather with soap for at least 20 seconds!    Follow-up Appointment:   With Dr. Dong MD  On 5/7/2024 at 3:45 PM  [facility]  in [place]  [address] [place] [address]  [phone]    Follow up with PCP in 6-8 weeks for TSH recheck.    Recovery apps for your phone for educational purposes and to locate in person and zoom recovery meetings  Everything AA - educational purpose and pedro is a great resource  12 Step Toolkit - educational purpose learning about the 12 steps to recovery  [place] - meeting pedro  AA  - meeting pedro  Meeting guide - meeting pedro  Quick NA meeting - meeting pedro  Dipak- has various apps    Resources:  Some AA/NA meetings are being held online however most have returned to in-person or a hybrid combination please check online to verify*  Need Support Now? If you or someone you know is struggling or in crisis, help is available. Call or text Maiyet or chat [employer]  AA meetings search for them at: https://aa-intergroup.org (worldwide meeting listings)  AA meetings for [place] area can be found online at: https://a[place].org (click local online meetings listings)  NA meetings for [place] area can be found online at: https://www.nam[place].org  (click find a meeting)  AA and NA Sponsors are excellent resources for support and you can find one at any support group meeting.   Alcoholics Anonymous (https://aa.org/): for information 24 hours/day  AA Intergroup service office in [place] (http://www.aastpaul.org/) 135.396.7309  AA Intergroup service office in [facility]: [phone]. (http://www.a[place].org/)  Narcotics Anonymous (www.nam[place].org) (404) 154-9326  https://aafairviewriverside.org/meetings  SMART Recovery - self management for addiction recovery:  www.smartrecovery.org  Pathways ~ A Health Crisis Resource & Support Center:  277.378.2059.  https://prescribetoprevent.org/patient-education/videos/  http://www.harmreduction.org  Crisis Text Line  Text 421762  You will be connected with a trained live crisis counselor to provide support. Por esppaola, tutuo  PHI a 758714 o texto a  "442-AYUDAME en WhatsApp  National Hope Line  1.800.SUICIDE [6997542]  [facility] 287-159-8567  Support Group:  AA/NA and Sponsor/support.  Fast Tracker  Linking people to mental health and substance use disorder resources  [facility]   [place] Mental Health Warm Line  Peer to peer support  Monday thru Saturday, 12 pm to 10 pm  340.250.3049 or 7.068.620.2633  Text \"Support\" to 04556  National [place] on Mental Illness (SEBASTIEN)  377.864.6925 or 1.888.SEBASTIEN.HELPS  Alcoholics Anonymous (www.alcoholics-anonymous.org): Check your phone book for your local chapter.  Suicide Awareness Voices of Education (SAVE) (www.save.org): 342-994-HBFC (7283)  National Suicide Prevention Line (www.mentalhealthmn.org): 377-086-WEEJ (9694)  Mental Health Consumer/Survivor Network of [place] (www.mhcsn.net): 150.684.5447 or 689-141-5487  Mental Health Association of [place] (www.mentalhealth.org): 821.280.4508 or 316-668-0951   Substance Abuse and Mental Health Services (www.samhsa.gov)  [place] Opioid Prevention Coalition: www.opioidcoalition.org    [place] Recovery Connection (MRC)  [facility] connects people seeking recovery to resources that help foster and sustain long-term recovery.  Whether you are seeking resources for treatment, transportation, housing, job training, education, health care or other pathways to recovery, [facility] is a great place to start.  869.350.4018.  www.[place]arecovery.org    Great Pod casts for nutrition and wellness  Listen on Apple Podcasts  [employer]ishing [employer] Nutrition   [employer]   Nutrition       Understand the connection between what you eat and how you feel. Hosted by licensed nutritionists and dietitians from [employer] we share practical, real-life solutions for healthier living through nutrition.     General  Instructions:   Go to all your primary care provider visits.  Do not use any forms of alcohol.    Please Note:  If you have any questions " at anytime after you are discharged please call [facility] detox unit 3AW at [phone].  [facility], Behavioral Intake [phone]  Medical Records call [phone]  Please remember to take all of your behavioral discharge planning and lab paperwork to any follow up appointments, it contains your lab results, diagnosis, medication list and discharge recommendations.      THANK YOU FOR CHOOSING [facility]

## 2024-04-25 NOTE — PLAN OF CARE
Behavioral Team Discussion: (4/25/2024)    Continued Stay Criteria/Rationale: Patient admitted for  Alcohol Use Disorder.  Plan: The following services will be provided to the patient; psychiatric assessment, medication management, therapeutic milieu, individual and group support, and skills groups.   Participants: 3A Provider: Dr. Joaquin Bill MD; 3A RN: Valente Yang RN; 3A CM's: Ricardo Cantu.  Summary/Recommendation: Providers will assess today for treatment recommendations, discharge planning, and aftercare plans. CM will meet with pt for discharge planning.   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  Precautions:   Behavioral Orders   Procedures    Code 1 - Restrict to Unit    Routine Programming     As clinically indicated    Status 15     Every 15 minutes.    Withdrawal precautions     Rationale for change in precautions or plan: N/A  Progress: Initial.    ASAM Dimension Scale Ratings:  Dimension 1: 3 Client tolerates and alisha with withdrawal discomfort poorly. Client has severe intoxication, such that the client endangers self or others, or intoxication has not abated with less intensive levels of services. Client displays severe signs and symptoms; or risk of severe, but manageable withdrawal; or withdrawal worsening despite detox at less intensive level.  Dimension 2: 1 Client tolerates and alisha with physical discomfort and is able to get the services that the client needs.  Dimension 3: 2 Client has difficulty with impulse control and lacks coping skills. Client has thoughts of suicide or harm to others without means; however, the thoughts may interfere with participation in some treatment activities. Client has difficulty functioning in significant life areas. Client has moderate symptoms of emotional, behavioral, or cognitive  problems. Client is able to participate in most treatment activities.  Dimension 4: 3 Client displays inconsistent compliance, minimal awareness of either the client's addiction or mental disorder, and is minimally cooperative.  Dimension 5: 3 Client has poor recognition and understanding of relapse and recidivism issues and displays moderately high vulnerability for further substance use or mental health problems. Client has few coping skills and rarely applies coping skills.  Dimension 6: 3 Client is not engaged in structured, meaningful activity and the client's peers, family, significant other, and living environment are unsupportive, or there is significant criminal justice system involvement.

## 2024-04-25 NOTE — CONSULTS
[facility]  Internal Medicine Consult     Hilaria Mcnulty MRN# [id number]   Age: 33 year old YOB: 1990     Date of Admission: 4/23/2024  Date of Consult: 4/25/2024    Primary Care Provider: Jamison Raymundo    Requesting Service: Behavioral Health - Joaquin Bill MD  Reason for Consult: General Medical Evaluation      SUBJECTIVE   CC:   Alcohol Withdrawal    Assessment and Plan/Recommendations:     Hilaria Mcnulty is a 33 year old female with PMHx significant for depression, anxiety, asthma who was admitted on 4/23/2024 to inpatient detox for acute alcohol withdrawal.     Medicine team was consulted for medical co-management.     Alcohol withdrawal, hx of alcohol use disorder   MSSA 7 this shift.  Denies hx of withdrawal seizures.  Pt reports drinking 4-5 mixed alcoholic beverages when she is drinking, but was consuming ~ 1L of daily for 3 days leading up to admission. Breath EtOH on arrival was 0.295.   - Continue MSSA   - Folvite, multi-vites, thiamine supplementation   - Further management per Psychiatry     Accelerated blood pressure   Tachycardia   Suspect in the setting of acute withdrawal. No dizziness, lightheadedness, chest pain, palpitation.  No history of hypertension. ECG with sinus rhythm.  -Monitor   -PRN Hydralazine for SBP >180 or DBP>110    Prolonged Qtc: 460, improved from previous (472). Caution with QT prolonging agents     AGMA: suspect in the setting of poor PO intake and alcohol use PTA, gap improving. BMP in AM.     Subclinical hypothyroidism: TSH 5.37, T4 1.5. Recommend recheck with PCP as OP in 6-8 weeks once no longer in acute withdrawal.     Asthma: no acute exacerbation. Doesn't use any daily inhaler. PRN albuterol ordered.     Anxiety: PTA PRN alprazolam and Paxil, Management per Psychiatry     Contraceptive: PTA birth control ordered     Resolved:   Hypocalcemia, mild, resolved: suspect in the setting of poor PO intake, recheck showed this had  "normalized.     Medicine will follow up AM BMP, blood pressure and ECG       Jyotsna Uribe PA-C  Internal Medicine MAAME Hospitalist  Page job code 8783 (3B), 5903 (3A), or 7277 ([facility] and )  Text paging via [employer] is appreciated  April 25, 2024         HPI:   Hilaria Mcnulty is a 33 year old female with PMHx significant for depression, anxiety, asthma who was admitted on 4/23/2024 to inpatient detox for acute alcohol withdrawal.     Feeling well.  Notes bad anxiety and a headache, but attributes this to not having any Diet Coke, denies any nausea or vomiting.  Denies any tremor.  Denies any other withdrawal symptoms.  No chest pain, dizziness, lightheadedness, palpitations, dyspnea, cough.  Typically does not use anything for asthma.  No recent exacerbation, wheezing, chest tightness.  Very infrequently uses her albuterol inhaler as needed.  Having bowel movements.  Denies any diarrhea or constipation. No missed doses of medications.        Past Medical History:   History reviewed. No pertinent past medical history.     Reviewed and updated in [employer].     Past Surgical History:    History reviewed. No pertinent surgical history.      Social History:     Social History     Tobacco Use    Smoking status: Never    Smokeless tobacco: Never   Substance Use Topics    Alcohol use: Yes    Drug use: Not Currently        Family History:   No family history on file.      Allergies:   No Known Allergies      Medications:   Reviewed. Please see MAR     Review of Systems:   10 point ROS of systems including Constitutional, Eyes, Respiratory, Cardiovascular, Gastroenterology, Genitourinary, Integumentary, Muscularskeletal, Psychiatric were all negative except for pertinent positives noted in my HPI.    OBJECTIVE   Physical Exam:   Vitals were reviewed  Blood pressure (!) 155/110, pulse 116, temperature 97.3  F (36.3  C), temperature source Temporal, resp. rate 16, height 1.651 m (5' 5\"), weight 86.2 kg (190 lb), SpO2 " 97%.  General: Alert and oriented x3.   EYES: PERRLA, EOM. Anicteric   HENT: Atraumatic. Normocephalic, MMM, no lymphadenopathy   Lungs: No increased work of breathing, CTAB, no crackles, wheezing, rhonchi appreciated.    Cardiac: RRR with nl S1/S2 no murmurs, clicks, rubs or gallops, no peripheral edema bilaterally   GI: Abdomen soft, non-tender without rebound or guarding. + Bowel sounds.  Neurologic: No focal deficits, CN II-XII grossly intact  Skin: No jaundice, rashes, or lesions        Data:        Lab Results   Component Value Date     04/25/2024     11/21/2015    Lab Results   Component Value Date    CHLORIDE 107 04/25/2024    CHLORIDE 117 10/29/2021    CHLORIDE 108 11/21/2015    Lab Results   Component Value Date    BUN 4.7 04/25/2024    BUN 7 10/29/2021    BUN 4 11/21/2015      Lab Results   Component Value Date    POTASSIUM 4.5 04/25/2024    POTASSIUM 3.5 10/29/2021    POTASSIUM 3.6 11/21/2015    Lab Results   Component Value Date    CO2 19 04/25/2024    CO2 20 10/29/2021    CO2 27 11/21/2015    Lab Results   Component Value Date    CR 0.58 04/25/2024    CR 0.64 11/21/2015        Lab Results   Component Value Date    WBC 7.9 04/23/2024    HGB 14.7 04/23/2024    HCT 44.7 04/23/2024    MCV 89 04/23/2024     04/23/2024     Lab Results   Component Value Date    WBC 7.9 04/23/2024
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
## 2024-04-25 NOTE — PROGRESS NOTES
Pt's MSSA 11, 11 and received Valium 10 mg x 2. Pt continues to have nausea and was given prn Maalox, does not like ginger ale or ginger. Pt pleasant and cooperative upon approach and denies SI,SIB,HI or AVH, readily contracts for safety on the unit.   Pt given prn Imodium x 2 for diarrhea and is on scheduled phenobarbital for her long term use of Xanax. Pt endorses very high anxiety and moderate depression, has never been on an inpatient detox unit before.   Pt is asking for a sound machine, but staff unable to find any at this time.   Pt makes her needs known appropriately and ambulates with a steady gait ad miah. Pt has spent time out in the lounge this evening watching movies with peers.

## 2024-04-25 NOTE — PLAN OF CARE
Problem: Alcohol Withdrawal  Goal: Alcohol Withdrawal Symptom Control  4/25/2024 0732 by Danis Neumann, RN  Outcome: Progressing  4/25/2024 0731 by Danis Neumann RN  Outcome: Not Progressing   Goal Outcome Evaluation:    MSSA scores of 9/10 patient received 20 mgs of valium for alcohol withdrawal and is observed to sleep throughout the noc.

## 2024-04-25 NOTE — PROGRESS NOTES
Pt's MSSA 11, 11 and received Valium 10 mg x 2. Pt continues to have nausea and was given prn Maalox, does not like ginger ale or ginger chews. Pt pleasant and cooperative upon approach and denies SI,SIB,HI or AVH, readily contracts for safety on the unit.   Pt given prn Imodium x 2 for diarrhea and is on scheduled phenobarbital for her long term use of Xanax. Pt endorses very high anxiety and moderate depression, has never been on an inpatient detox unit before.   Pt is asking for a sound machine, but staff unable to find any at this time.   Pt makes her needs known appropriately and ambulates with a steady gait ad miah. Pt has spent time out in the lounge this evening watching movies with peers.

## 2024-04-25 NOTE — PLAN OF CARE
"  Problem: Adult Behavioral Health Plan of Care  Goal: Adheres to Safety Considerations for Self and Others  Outcome: Progressing  Intervention: Develop and Maintain Individualized Safety Plan    Plan of Care Reviewed With: patient      Patient is up and visible in the milieu. Patient is ambulating independently. Patient is alert and oriented x 4. Patient is attending/participating in unit programming. Pt is social with peers. Affect is full range, mood is calm to anxious. Patient denies SI/SIB/HI. Pt denies auditory/visual hallucinations. Patient verbally contracts for safety on the unit. Pt had a \"caffeine\" HA at the start of the shift and it was resolved with prn APAP and a cold pack.     This RN administered the PRN medications Valium, APAP, Trazodone (see MAR) at the request of the patient. Patient is tolerating medications well, denies any current side effects.     Pt's MSSA= 8, 6 withdrawal assessment. Patient reports an improving appetite and poor sleep. Patient discharge plans to go home with outpatient support. Rest, fluids, and food encouraged. Status 15 checks remain. Patient is able to make needs known appropriately. Patient denies any unmet needs at this time.     Blood pressure (!) 139/99, pulse 96, temperature 97.4  F (36.3  C), temperature source Temporal, resp. rate 16, height 1.651 m (5' 5\"), weight 86.2 kg (190 lb), SpO2 97%.                 "

## 2024-04-26 VITALS
WEIGHT: 190 LBS | OXYGEN SATURATION: 95 % | HEART RATE: 91 BPM | RESPIRATION RATE: 18 BRPM | TEMPERATURE: 97.3 F | HEIGHT: 65 IN | BODY MASS INDEX: 31.65 KG/M2 | SYSTOLIC BLOOD PRESSURE: 141 MMHG | DIASTOLIC BLOOD PRESSURE: 95 MMHG

## 2024-04-26 LAB
ATRIAL RATE - MUSE: 92 BPM
DIASTOLIC BLOOD PRESSURE - MUSE: NORMAL MMHG
INTERPRETATION ECG - MUSE: NORMAL
P AXIS - MUSE: 42 DEGREES
PR INTERVAL - MUSE: 134 MS
QRS DURATION - MUSE: 76 MS
QT - MUSE: 372 MS
QTC - MUSE: 460 MS
R AXIS - MUSE: 7 DEGREES
SYSTOLIC BLOOD PRESSURE - MUSE: NORMAL MMHG
T AXIS - MUSE: -8 DEGREES
VENTRICULAR RATE- MUSE: 92 BPM

## 2024-04-26 PROCEDURE — 99232 SBSQ HOSP IP/OBS MODERATE 35: CPT | Performed by: PHYSICIAN ASSISTANT

## 2024-04-26 PROCEDURE — 99239 HOSP IP/OBS DSCHRG MGMT >30: CPT | Performed by: NURSE PRACTITIONER

## 2024-04-26 PROCEDURE — 250N000013 HC RX MED GY IP 250 OP 250 PS 637: Performed by: FAMILY MEDICINE

## 2024-04-26 PROCEDURE — 250N000013 HC RX MED GY IP 250 OP 250 PS 637: Performed by: NURSE PRACTITIONER

## 2024-04-26 PROCEDURE — 250N000013 HC RX MED GY IP 250 OP 250 PS 637: Performed by: PSYCHIATRY & NEUROLOGY

## 2024-04-26 RX ORDER — FOLIC ACID 1 MG/1
1 TABLET ORAL DAILY
Status: SHIPPED
Start: 2024-04-27

## 2024-04-26 RX ORDER — MULTIPLE VITAMINS W/ MINERALS TAB 9MG-400MCG
1 TAB ORAL DAILY
Status: SHIPPED
Start: 2024-04-27

## 2024-04-26 RX ORDER — LANOLIN ALCOHOL/MO/W.PET/CERES
100 CREAM (GRAM) TOPICAL DAILY
Status: SHIPPED
Start: 2024-04-27

## 2024-04-26 RX ORDER — ALBUTEROL SULFATE 90 UG/1
2 AEROSOL, METERED RESPIRATORY (INHALATION) EVERY 6 HOURS PRN
Status: SHIPPED
Start: 2024-04-26

## 2024-04-26 RX ADMIN — HYDROXYZINE HYDROCHLORIDE 25 MG: 25 TABLET, FILM COATED ORAL at 08:07

## 2024-04-26 RX ADMIN — DROSPIRENONE AND ETHINYL ESTRADIOL 1 TABLET: KIT at 09:21

## 2024-04-26 RX ADMIN — FOLIC ACID 1 MG: 1 TABLET ORAL at 08:07

## 2024-04-26 RX ADMIN — ACETAMINOPHEN 650 MG: 325 TABLET, FILM COATED ORAL at 09:06

## 2024-04-26 RX ADMIN — THIAMINE HCL TAB 100 MG 100 MG: 100 TAB at 08:07

## 2024-04-26 RX ADMIN — PHENOBARBITAL 64.8 MG: 64.8 TABLET ORAL at 09:06

## 2024-04-26 RX ADMIN — BUPROPION HYDROCHLORIDE 40 MG: 150 TABLET, FILM COATED, EXTENDED RELEASE ORAL at 08:07

## 2024-04-26 RX ADMIN — Medication 1 TABLET: at 08:07

## 2024-04-26 ASSESSMENT — ACTIVITIES OF DAILY LIVING (ADL)
ADLS_ACUITY_SCORE: 28
DRESS: SCRUBS (BEHAVIORAL HEALTH)
ADLS_ACUITY_SCORE: 28
ADLS_ACUITY_SCORE: 28
HYGIENE/GROOMING: INDEPENDENT
ADLS_ACUITY_SCORE: 28
ADLS_ACUITY_SCORE: 28
LAUNDRY: WITH SUPERVISION
ORAL_HYGIENE: INDEPENDENT
ADLS_ACUITY_SCORE: 28
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
## 2024-04-26 NOTE — PLAN OF CARE
Problem: Alcohol Withdrawal  Goal: Alcohol Withdrawal Symptom Control  Outcome: Progressing   Goal Outcome Evaluation:         MSSA score of 5, patient did not require medication for alcohol withdrawal and is observed to sleep throughout the noc.

## 2024-04-26 NOTE — PLAN OF CARE
Goal Outcome Evaluation:      Plan of Care Reviewed With: patient    Overall Patient Progress: no changeOverall Patient Progress: no change    Outcome Evaluation: Pt discharge is AMA    Pt signed a 72 hour intent to discharge and asked to leave AMA. Pt met with provider and an AMA discharge was ordered.     Pt was read the leaving against or without medical advice form which states as follows;     I have decided to leave against the advice of my provider (doctor, nurse practitioner, physician's assistant). My provider has not finished my exam and treatment. My provider has told me the risks of leaving now and the benefits of staying for further care.    I know that:  My doctor recommends further care.   Without such care, I may suffer more injury, illness or even death.  I am welcome to return to the Emergency Department at any time for care.     I agree the hospital or clinic and my providers are not responsible for any bad results.    Pt reports no questions and has signed the form. Pt denies suicidal ideation plans or intent. Pt discharge assisted via staff member Francisco LUKE directly to the lobby. Pt reports plan as to return home and go to support group meetings with her brother at [facility] along with attending her therapy/psychiatry appointments. Pt is now discharged.

## 2024-04-26 NOTE — PROGRESS NOTES
"Brief Hospital Medicine Note:     Medicine team following up on BMP and accelerated blood pressure and tachycardia. Nursing notes, vitals, and labs reviewed.     BP (!) 141/95 (BP Location: Left arm)   Pulse 91   Temp 97.3  F (36.3  C) (Temporal)   Resp 18   Ht 1.651 m (5' 5\")   Wt 86.2 kg (190 lb)   SpO2 95%   BMI 31.62 kg/m      Discussed with RN that patient is very anxious and recheck of vitals follow dose of hydroxyzine showed improvement. Patient was asymptomatic, no chest pain, dyspnea, dizziness or palpitations.  RN also informed Medicine that patient declined repeat lab work.     Medicine will sign off at this time. Thank you for the consult. If further questions arise, please reach out to Medicine Team. Please reach out to Medicine if patient were to become agreeable to having lab work taken or if patient is persistently tachycardia outside of withdrawal or anxiety or patient were to become symptomatic. Given refusal, patient recommended to have follow up BMP as OP with PCP.      Jyotsna Kelley PA-C  Hospitalist Service  Contact information available via [facility] Paging/Directory    "

## 2024-04-26 NOTE — PROGRESS NOTES
Pt has the following appointment in place for after discharge.     Follow-up Appointment:   With Dr. Dong MD  On 5/7/2024 at 3:45 PM  [address] [address] [phone]   Detail Level: Simple

## 2025-02-26 ENCOUNTER — HOSPITAL ENCOUNTER (EMERGENCY)
Facility: CLINIC | Age: 35
Discharge: HOME | End: 2025-02-27
Attending: EMERGENCY MEDICINE
Payer: COMMERCIAL

## 2025-02-26 DIAGNOSIS — F10.920 ALCOHOLIC INTOXICATION WITHOUT COMPLICATION: ICD-10-CM

## 2025-02-26 LAB
ALBUMIN SERPL BCG-MCNC: 4.7 G/DL (ref 3.5–5.2)
ALP SERPL-CCNC: 100 U/L (ref 40–150)
ALT SERPL W P-5'-P-CCNC: 18 U/L (ref 0–50)
ANION GAP SERPL CALCULATED.3IONS-SCNC: 15 MMOL/L (ref 7–15)
APAP SERPL-MCNC: <5 UG/ML (ref 10–30)
AST SERPL W P-5'-P-CCNC: 22 U/L (ref 0–45)
BASOPHILS # BLD AUTO: 0 10E3/UL (ref 0–0.2)
BASOPHILS NFR BLD AUTO: 1 %
BILIRUB SERPL-MCNC: <0.2 MG/DL
BUN SERPL-MCNC: 8 MG/DL (ref 6–20)
CALCIUM SERPL-MCNC: 8.5 MG/DL (ref 8.8–10.4)
CHLORIDE SERPL-SCNC: 110 MMOL/L (ref 98–107)
CREAT SERPL-MCNC: 0.7 MG/DL (ref 0.51–0.95)
EGFRCR SERPLBLD CKD-EPI 2021: >90 ML/MIN/1.73M2
EOSINOPHIL # BLD AUTO: 0 10E3/UL (ref 0–0.7)
EOSINOPHIL NFR BLD AUTO: 0 %
ERYTHROCYTE [DISTWIDTH] IN BLOOD BY AUTOMATED COUNT: 13.6 % (ref 10–15)
ETHANOL SERPL-MCNC: 0.4 G/DL
GLUCOSE SERPL-MCNC: 100 MG/DL (ref 70–99)
HCO3 SERPL-SCNC: 25 MMOL/L (ref 22–29)
HCT VFR BLD AUTO: 48 % (ref 35–47)
HGB BLD-MCNC: 15.8 G/DL (ref 11.7–15.7)
IMM GRANULOCYTES # BLD: 0 10E3/UL
IMM GRANULOCYTES NFR BLD: 0 %
LYMPHOCYTES # BLD AUTO: 2.1 10E3/UL (ref 0.8–5.3)
LYMPHOCYTES NFR BLD AUTO: 35 %
MAGNESIUM SERPL-MCNC: 2.4 MG/DL (ref 1.7–2.3)
MCH RBC QN AUTO: 29.7 PG (ref 26.5–33)
MCHC RBC AUTO-ENTMCNC: 32.9 G/DL (ref 31.5–36.5)
MCV RBC AUTO: 90 FL (ref 78–100)
MONOCYTES # BLD AUTO: 0.2 10E3/UL (ref 0–1.3)
MONOCYTES NFR BLD AUTO: 3 %
NEUTROPHILS # BLD AUTO: 3.6 10E3/UL (ref 1.6–8.3)
NEUTROPHILS NFR BLD AUTO: 61 %
NRBC # BLD AUTO: 0 10E3/UL
NRBC BLD AUTO-RTO: 0 /100
PLATELET # BLD AUTO: 308 10E3/UL (ref 150–450)
POTASSIUM SERPL-SCNC: 4 MMOL/L (ref 3.4–5.3)
PROT SERPL-MCNC: 7.6 G/DL (ref 6.4–8.3)
RBC # BLD AUTO: 5.32 10E6/UL (ref 3.8–5.2)
SODIUM SERPL-SCNC: 150 MMOL/L (ref 135–145)
WBC # BLD AUTO: 5.9 10E3/UL (ref 4–11)

## 2025-02-26 PROCEDURE — 96361 HYDRATE IV INFUSION ADD-ON: CPT | Performed by: EMERGENCY MEDICINE

## 2025-02-26 PROCEDURE — 82310 ASSAY OF CALCIUM: CPT | Performed by: EMERGENCY MEDICINE

## 2025-02-26 PROCEDURE — 93005 ELECTROCARDIOGRAM TRACING: CPT | Performed by: EMERGENCY MEDICINE

## 2025-02-26 PROCEDURE — 93010 ELECTROCARDIOGRAM REPORT: CPT | Performed by: EMERGENCY MEDICINE

## 2025-02-26 PROCEDURE — 83735 ASSAY OF MAGNESIUM: CPT | Performed by: EMERGENCY MEDICINE

## 2025-02-26 PROCEDURE — 96360 HYDRATION IV INFUSION INIT: CPT | Performed by: EMERGENCY MEDICINE

## 2025-02-26 PROCEDURE — 36415 COLL VENOUS BLD VENIPUNCTURE: CPT | Performed by: EMERGENCY MEDICINE

## 2025-02-26 PROCEDURE — 258N000003 HC RX IP 258 OP 636: Performed by: EMERGENCY MEDICINE

## 2025-02-26 PROCEDURE — 99284 EMERGENCY DEPT VISIT MOD MDM: CPT | Mod: 25 | Performed by: EMERGENCY MEDICINE

## 2025-02-26 PROCEDURE — 82077 ASSAY SPEC XCP UR&BREATH IA: CPT | Performed by: EMERGENCY MEDICINE

## 2025-02-26 PROCEDURE — 82374 ASSAY BLOOD CARBON DIOXIDE: CPT | Performed by: EMERGENCY MEDICINE

## 2025-02-26 PROCEDURE — 99284 EMERGENCY DEPT VISIT MOD MDM: CPT | Performed by: EMERGENCY MEDICINE

## 2025-02-26 PROCEDURE — 85004 AUTOMATED DIFF WBC COUNT: CPT | Performed by: EMERGENCY MEDICINE

## 2025-02-26 PROCEDURE — 80143 DRUG ASSAY ACETAMINOPHEN: CPT | Performed by: EMERGENCY MEDICINE

## 2025-02-26 RX ADMIN — SODIUM CHLORIDE 1000 ML: 9 INJECTION, SOLUTION INTRAVENOUS at 22:53

## 2025-02-26 ASSESSMENT — ACTIVITIES OF DAILY LIVING (ADL)
ADLS_ACUITY_SCORE: 45
ADLS_ACUITY_SCORE: 45
ADLS_ACUITY_SCORE: 41

## 2025-02-26 ASSESSMENT — COLUMBIA-SUICIDE SEVERITY RATING SCALE - C-SSRS: IS THE PATIENT NOT ABLE TO COMPLETE C-SSRS: UNABLE TO VERBALIZE

## 2025-02-27 VITALS
OXYGEN SATURATION: 93 % | SYSTOLIC BLOOD PRESSURE: 98 MMHG | WEIGHT: 190 LBS | HEART RATE: 102 BPM | HEIGHT: 66 IN | RESPIRATION RATE: 16 BRPM | BODY MASS INDEX: 30.53 KG/M2 | DIASTOLIC BLOOD PRESSURE: 71 MMHG

## 2025-02-27 LAB
AMPHETAMINES UR QL SCN: ABNORMAL
ANION GAP SERPL CALCULATED.3IONS-SCNC: 17 MMOL/L (ref 7–15)
ATRIAL RATE - MUSE: 96 BPM
ATRIAL RATE - MUSE: 99 BPM
BARBITURATES UR QL SCN: ABNORMAL
BENZODIAZ UR QL SCN: ABNORMAL
BUN SERPL-MCNC: 8.4 MG/DL (ref 6–20)
BZE UR QL SCN: ABNORMAL
CALCIUM SERPL-MCNC: 7.7 MG/DL (ref 8.8–10.4)
CANNABINOIDS UR QL SCN: ABNORMAL
CHLORIDE SERPL-SCNC: 107 MMOL/L (ref 98–107)
CREAT SERPL-MCNC: 0.63 MG/DL (ref 0.51–0.95)
DIASTOLIC BLOOD PRESSURE - MUSE: NORMAL MMHG
DIASTOLIC BLOOD PRESSURE - MUSE: NORMAL MMHG
EGFRCR SERPLBLD CKD-EPI 2021: >90 ML/MIN/1.73M2
FENTANYL UR QL: ABNORMAL
GLUCOSE SERPL-MCNC: 99 MG/DL (ref 70–99)
HCG UR QL: NEGATIVE
HCO3 SERPL-SCNC: 20 MMOL/L (ref 22–29)
INTERPRETATION ECG - MUSE: NORMAL
INTERPRETATION ECG - MUSE: NORMAL
OPIATES UR QL SCN: ABNORMAL
P AXIS - MUSE: 26 DEGREES
P AXIS - MUSE: 32 DEGREES
PCP QUAL URINE (ROCHE): ABNORMAL
POTASSIUM SERPL-SCNC: 4.2 MMOL/L (ref 3.4–5.3)
PR INTERVAL - MUSE: 134 MS
PR INTERVAL - MUSE: 138 MS
QRS DURATION - MUSE: 78 MS
QRS DURATION - MUSE: 80 MS
QT - MUSE: 380 MS
QT - MUSE: 380 MS
QTC - MUSE: 480 MS
QTC - MUSE: 487 MS
R AXIS - MUSE: 2 DEGREES
R AXIS - MUSE: 3 DEGREES
SODIUM SERPL-SCNC: 144 MMOL/L (ref 135–145)
SYSTOLIC BLOOD PRESSURE - MUSE: NORMAL MMHG
SYSTOLIC BLOOD PRESSURE - MUSE: NORMAL MMHG
T AXIS - MUSE: -10 DEGREES
T AXIS - MUSE: -14 DEGREES
TROPONIN T SERPL HS-MCNC: <6 NG/L
VENTRICULAR RATE- MUSE: 96 BPM
VENTRICULAR RATE- MUSE: 99 BPM

## 2025-02-27 PROCEDURE — 80307 DRUG TEST PRSMV CHEM ANLYZR: CPT | Performed by: EMERGENCY MEDICINE

## 2025-02-27 PROCEDURE — 84484 ASSAY OF TROPONIN QUANT: CPT | Performed by: EMERGENCY MEDICINE

## 2025-02-27 PROCEDURE — 81025 URINE PREGNANCY TEST: CPT | Performed by: EMERGENCY MEDICINE

## 2025-02-27 PROCEDURE — 80048 BASIC METABOLIC PNL TOTAL CA: CPT | Performed by: EMERGENCY MEDICINE

## 2025-02-27 PROCEDURE — 36415 COLL VENOUS BLD VENIPUNCTURE: CPT | Performed by: EMERGENCY MEDICINE

## 2025-02-27 PROCEDURE — 250N000013 HC RX MED GY IP 250 OP 250 PS 637: Performed by: EMERGENCY MEDICINE

## 2025-02-27 RX ORDER — ALPRAZOLAM 0.5 MG
1 TABLET ORAL 3 TIMES DAILY
Status: DISCONTINUED | OUTPATIENT
Start: 2025-02-27 | End: 2025-02-27

## 2025-02-27 RX ORDER — ALPRAZOLAM 0.5 MG
1 TABLET ORAL 3 TIMES DAILY
Status: DISCONTINUED | OUTPATIENT
Start: 2025-02-27 | End: 2025-02-27 | Stop reason: HOSPADM

## 2025-02-27 RX ORDER — PAROXETINE 20 MG/1
40 TABLET, FILM COATED ORAL EVERY MORNING
Status: DISCONTINUED | OUTPATIENT
Start: 2025-02-27 | End: 2025-02-27

## 2025-02-27 RX ORDER — PAROXETINE 20 MG/1
40 TABLET, FILM COATED ORAL EVERY MORNING
Status: DISCONTINUED | OUTPATIENT
Start: 2025-02-27 | End: 2025-02-27 | Stop reason: HOSPADM

## 2025-02-27 RX ADMIN — PAROXETINE HYDROCHLORIDE 40 MG: 20 TABLET, FILM COATED ORAL at 06:47

## 2025-02-27 RX ADMIN — ALPRAZOLAM 1 MG: 0.5 TABLET ORAL at 06:47

## 2025-02-27 ASSESSMENT — ACTIVITIES OF DAILY LIVING (ADL)
ADLS_ACUITY_SCORE: 45

## 2025-02-27 NOTE — ED NOTES
Patient requested cab be called since no one is available to give her a ride back home. Waiting for meds from pharmacy as well as registration.    Address: [address], [place]

## 2025-02-27 NOTE — ED PROVIDER NOTES
"    [facility] EMERGENCY DEPARTMENT ([facility])    2/26/25      ED PROVIDER NOTE    History     Chief Complaint   Patient presents with    Altered Mental Status     Pt only oriented to self    Drug / Alcohol Assessment     Pt drank unknown amount of etoh, and took an unknown amount of unknown medication     The history is provided by the patient, the EMS personnel and medical records.     Hilaria Mcnulty is a 34 year old female with a history notable for alcohol dependence, benzodiazepine dependence, MDD, MONICA, asthma who presents to the ED via EMS with altered mental status.    Patient is a poor historian she denies drug use she does endorse alcohol use.  When asked about taking her home meds she says yes when asked if she took too much of any of her home meds she says \" no \"followed by yes\".  Unclear historian.  She denies pain or trauma.    Per EMS patient was found with multiple pill bottles, none empty.  No comment from EMS about fill dates excetra.  And a large \"case of wine\" largely empty.  Here patient has altered mental status.  She is a unreliable  she does endorse drinking.  She denies illicit drug use.    Past Medical History  No past medical history on file.  No past surgical history on file.  albuterol (PROAIR HFA/PROVENTIL HFA/VENTOLIN HFA) 108 (90 Base) MCG/ACT inhaler  ALPRAZolam (XANAX) 1 MG tablet  drospirenone-ethinyl estradiol (YUSUF) 3-0.02 MG tablet  folic acid (FOLVITE) 1 MG tablet  multivitamin w/minerals (THERA-VIT-M) tablet  PARoxetine (PAXIL) 20 MG tablet  thiamine (B-1) 100 MG tablet      No Known Allergies  Family History  No family history on file.  Social History   Social History     Tobacco Use    Smoking status: Never    Smokeless tobacco: Never   Substance Use Topics    Alcohol use: Yes    Drug use: Not Currently      Past medical history, past surgical history, medications, allergies, family history, and social history were reviewed with the patient. No additional " pertinent items.     A complete review of systems was performed with pertinent positives and negatives noted in the HPI, and all other systems negative.    Physical Exam   BP: 98/71  Pulse: 101  Resp: 16  SpO2: 93 %    Physical Exam  General: awake, somnolent, arouses to verbal stimulation  Head: normal cephalic, nontraumatic  HEENT: pupils equal, conjugate gaze intact  Neck: Supple  CV: regular rate and rhythm without murmur  Lungs: clear to auscultation  Abd: soft, non-tender, no guarding, no peritoneal signs  EXT: lower extremities without swelling or edema  Neuro: Somnolent but arouses to verbal stimulation.  Answers yes/no questions with some slurred speech but answers them inconsistently.  Moves all extremities equally.    ED Course, Procedures, & Data      Procedures            EKG Interpretation:      Interpreted by Jose Raul Johnson MD  Time reviewed: 2039  Symptoms at time of EKG: AMS   Rhythm: sinus rhythm  Rate: 99 bpm  Axis: normal  Ectopy: none  Conduction: normal  ST Segments/ T Waves: No ST-T wave changes  Q Waves: none  Comparison to prior: Similar to prior from 4/25/2024    Clinical Impression: prolonged QT similar to previous - no acute ischemic changes      Results for orders placed or performed during the hospital encounter of 02/26/25   Comprehensive metabolic panel     Status: Abnormal   Result Value Ref Range    Sodium 150 (H) 135 - 145 mmol/L    Potassium 4.0 3.4 - 5.3 mmol/L    Carbon Dioxide (CO2) 25 22 - 29 mmol/L    Anion Gap 15 7 - 15 mmol/L    Urea Nitrogen 8.0 6.0 - 20.0 mg/dL    Creatinine 0.70 0.51 - 0.95 mg/dL    GFR Estimate >90 >60 mL/min/1.73m2    Calcium 8.5 (L) 8.8 - 10.4 mg/dL    Chloride 110 (H) 98 - 107 mmol/L    Glucose 100 (H) 70 - 99 mg/dL    Alkaline Phosphatase 100 40 - 150 U/L    AST 22 0 - 45 U/L    ALT 18 0 - 50 U/L    Protein Total 7.6 6.4 - 8.3 g/dL    Albumin 4.7 3.5 - 5.2 g/dL    Bilirubin Total <0.2 <=1.2 mg/dL   Alcohol level blood     Status: Abnormal   Result Value  Ref Range    Alcohol ethyl 0.40 (HH) <=0.01 g/dL   Acetaminophen level     Status: Abnormal   Result Value Ref Range    Acetaminophen <5.0 (L) 10.0 - 30.0 ug/mL   Magnesium     Status: Abnormal   Result Value Ref Range    Magnesium 2.4 (H) 1.7 - 2.3 mg/dL   CBC with platelets and differential     Status: Abnormal   Result Value Ref Range    WBC Count 5.9 4.0 - 11.0 10e3/uL    RBC Count 5.32 (H) 3.80 - 5.20 10e6/uL    Hemoglobin 15.8 (H) 11.7 - 15.7 g/dL    Hematocrit 48.0 (H) 35.0 - 47.0 %    MCV 90 78 - 100 fL    MCH 29.7 26.5 - 33.0 pg    MCHC 32.9 31.5 - 36.5 g/dL    RDW 13.6 10.0 - 15.0 %    Platelet Count 308 150 - 450 10e3/uL    % Neutrophils 61 %    % Lymphocytes 35 %    % Monocytes 3 %    % Eosinophils 0 %    % Basophils 1 %    % Immature Granulocytes 0 %    NRBCs per 100 WBC 0 <1 /100    Absolute Neutrophils 3.6 1.6 - 8.3 10e3/uL    Absolute Lymphocytes 2.1 0.8 - 5.3 10e3/uL    Absolute Monocytes 0.2 0.0 - 1.3 10e3/uL    Absolute Eosinophils 0.0 0.0 - 0.7 10e3/uL    Absolute Basophils 0.0 0.0 - 0.2 10e3/uL    Absolute Immature Granulocytes 0.0 <=0.4 10e3/uL    Absolute NRBCs 0.0 10e3/uL   EKG 12 lead     Status: None (Preliminary result)   Result Value Ref Range    Systolic Blood Pressure  mmHg    Diastolic Blood Pressure  mmHg    Ventricular Rate 99 BPM    Atrial Rate 99 BPM    AR Interval 138 ms    QRS Duration 78 ms     ms    QTc 487 ms    P Axis 32 degrees    R AXIS 3 degrees    T Axis -10 degrees    Interpretation ECG Sinus rhythm  Prolonged QT  Abnormal ECG      CBC with platelets differential     Status: Abnormal    Narrative    The following orders were created for panel order CBC with platelets differential.  Procedure                               Abnormality         Status                     ---------                               -----------         ------                     CBC with platelets and d...[[id number]]  Abnormal            Final result                 Please view results for  these tests on the individual orders.   Urine Drug Screen     Status: None ()    Narrative    The following orders were created for panel order Urine Drug Screen.  Procedure                               Abnormality         Status                     ---------                               -----------         ------                     Urine Drug Screen Panel[[id number]]                                                       Please view results for these tests on the individual orders.     Medications   sodium chloride 0.9% BOLUS 1,000 mL (0 mLs Intravenous Stopped 2/27/25 0055)     Labs Ordered and Resulted from Time of ED Arrival to Time of ED Departure   COMPREHENSIVE METABOLIC PANEL - Abnormal       Result Value    Sodium 150 (*)     Potassium 4.0      Carbon Dioxide (CO2) 25      Anion Gap 15      Urea Nitrogen 8.0      Creatinine 0.70      GFR Estimate >90      Calcium 8.5 (*)     Chloride 110 (*)     Glucose 100 (*)     Alkaline Phosphatase 100      AST 22      ALT 18      Protein Total 7.6      Albumin 4.7      Bilirubin Total <0.2     ETHYL ALCOHOL LEVEL - Abnormal    Alcohol ethyl 0.40 (*)    ACETAMINOPHEN LEVEL - Abnormal    Acetaminophen <5.0 (*)    MAGNESIUM - Abnormal    Magnesium 2.4 (*)    CBC WITH PLATELETS AND DIFFERENTIAL - Abnormal    WBC Count 5.9      RBC Count 5.32 (*)     Hemoglobin 15.8 (*)     Hematocrit 48.0 (*)     MCV 90      MCH 29.7      MCHC 32.9      RDW 13.6      Platelet Count 308      % Neutrophils 61      % Lymphocytes 35      % Monocytes 3      % Eosinophils 0      % Basophils 1      % Immature Granulocytes 0      NRBCs per 100 WBC 0      Absolute Neutrophils 3.6      Absolute Lymphocytes 2.1      Absolute Monocytes 0.2      Absolute Eosinophils 0.0      Absolute Basophils 0.0      Absolute Immature Granulocytes 0.0      Absolute NRBCs 0.0     HCG QUALITATIVE URINE   URINE DRUG SCREEN PANEL     No orders to display          Critical care was not performed.     Medical Decision  "Making  The patient's presentation was of high complexity (an acute health issue posing potential threat to life or bodily function).    The patient's evaluation involved:  review of 1 test result(s) ordered prior to this encounter (see separate area of note for details)  ordering and/or review of 3+ test(s) in this encounter (see separate area of note for details)    The patient's management necessitated high risk (a decision regarding hospitalization).    Assessment & Plan    Patient arrived via EMS with significant altered mental status.  She is a very poor historian she does awake to verbal stimulation but does not answer questions reliably she is mildly hypoxic when she falls asleep to the high 80s but otherwise has normal vital signs heart lung sound good no evidence of trauma.  She does endorse alcohol use.  Will draw labs, EKG and reassess.    Labs notable for an alcohol level of 0.4.  EKG without signs of toxic ingestion, she does have a mildly prolonged QT with is unchanged from previous.  Tylenol level negative.    On reassessment patient was much more awake.  She endorses drinking significant mount alcohol \"I do not know I did it\" but she denies attempts for self-harm.  Endorses taking in her normal medications but denies any overdose of her medications.  She is quite tearful remorseful.  She believes that her brother called 911 when he saw her.  For me she is denying any suicidal thoughts or attempts.    Will continue to monitor until she is clinically sober.  Will sign out to Dr. Young, would consider a chemical dependency and or DEC assessment based on patient's reassessment.    I have reviewed the nursing notes. I have reviewed the findings, diagnosis, plan and need for follow up with the patient.    I have also reviewed and interpreted all laboratory and imaging studies that are available.    New Prescriptions    No medications on file       Final diagnoses:   None       Jose Raul So MD  [facility] " [facility] EMERGENCY DEPARTMENT  2/26/2025     Jose Raul Johnson MD  02/27/25 0122

## 2025-02-27 NOTE — ED TRIAGE NOTES
BIBA from home    Intoxication, polypharm od? (Unsure what medications pt took or amount, pills remained in all bottles per EMS), AMS, dilated pupils, oriented to self, abd pain, headache, nausea    1 emesis episode at home, wine delivery in am, meds in home, VSS en route, en route RR 28 shallow    4L NC en route, blood glucose 81    Medication found in home included: alprazolam, percocet, bupropion, ibuprofen, cortisone-10, lamotrigine, quetiapine

## 2025-02-27 NOTE — DISCHARGE INSTRUCTIONS
Please use the below resources and your primary care physician to safely cease alcohol and/or substance use.     Return to the ED if you are having any urgent/life-threatening concerns.     DISCHARGE RESOURCES:  -[facility]: [phone] (detox), [phone] (outpatient & [facility])  -[facility] ([address]): [phone]  -[facility]: [phone]  -[facility]: [phone]  -[facility] - self management for addiction recovery:  www.smartrecovery.org    -[facility]: [phone].  -[facility] [phone]   -Substance Abuse and Mental Health Services (www.samhsa.gov)  -Harm Reduction Coalition (www. Harmreduction.org)  -[place] Opioid Prevention Coalition: www.opioidcoalition.org  -Poison control 1-667.813.1978       Sober Support Group Information:  AA/NA & Sponsor/Support  -Alcoholics Anonymous (www.alcoholics-anonymous.org): for local information 24 hours/day  -AA Intergroup service office in [place] (http://www.aastpaul.org/) [phone]  -AA Intergroup service office in [facility]: [phone]. (http://www.a[place].org/)  -Narcotics Anonymous (www.nam[place].org) (221) 241-3146   -Sober Fun Activities: www.sober-activities.[employer]iodesix.Pounce/[facility]//[facility] Recovery Connection ([facility])  [facility] connects people seeking recovery to resources that help foster and sustain long-term recovery.  Whether you are seeking resources for treatment, transportation, housing, job training, education, health care or other pathways to recovery, [facility] is a great place to start.   Phone: [phone]. www.[place]Invited[place].City [place] (Great listing of all types of recovery and non-recovery related resources)

## 2025-02-27 NOTE — ED PROVIDER NOTES
"Emergency Department I-PASS Sign-out      Illness Severity: \"Watcher\"    Patient Summary:  34 year old female with pertinent PMH of alcohol dependence, benzodiazepine dependence, MDD, MONICA, asthma who presented with AMS    ED Course/treatment plan:   Labs drawn notable for blood alcohol of 0.4  EKG without signs of toxic ingestion  Tylenol level negative  Fluids      Clinical Impression:  No diagnosis found.    Edited by: Jose Raul Johnson MD at 2/27/2025 0123    Action List:  -To do:  Reassess after fluids;  Clinical sobriety  -Poison control recommends repeat EKG at hour 6.  Once patient is clinically sober offered DEC, substance use, or detox based on her responses    Situational Awareness & Contingency Planning:  Code Status (Most recent):  Prior    Disposition:  to be determined    Edited by: Jose Raul Johnson MD at 2/27/2025 0123    Synthesis & Events after sign-out:  Patient was monitored in the ED overnight without any acute issues.  The DEC  attempted assessment but patient was unwilling.  She adamantly denies any homicidal or suicidal ideation.  Throughout the night shift, she cleared appropriately.  On my repeat assessment in the morning, patient continues to deny any homicidal or suicidal ideation.  She is not interested in detox.  She states that she does not drink regularly.  I gave her her home medications.  She denies any medical complaints at this time.  She does not have a holdable condition.  She was discharged home in good condition with instructions to return with any new or worsening symptoms.    Geovany Young DO   Emergency Medicine     Geovany Young DO  02/27/25 0605    "

## 2025-02-27 NOTE — ED NOTES
Around 0300, DEC attempted, but pt declined due to not feeling oriented and sleepy. PT appears to still clearing from intoxication. Per MED, He will reassess and ED staff will update when Pt is ready or in need of DEC at that point.     Jarocho BAIN   DEC

## 2025-02-27 NOTE — ED NOTES
Bed: ED07  Expected date:   Expected time:   Means of arrival:   Comments:  [id number] F Polysubstance overdose on transport hold ETA 20 mins

## 2025-02-27 NOTE — ED NOTES
Patient requested that writer call her brother, as he was not answering her personal cell and she would like to update him.    Called brother with no answer; Patient gave permission for the writer to contact her mother and give her an update. Mom answered and report given, questions answered.    Mom Cell (Josephine Mcnulty): [phone]

## 2025-02-27 NOTE — ED NOTES
Poison control called writer to check on patient status. Writer gave VS, and was informed to recheck EKG in about 5-6 hours to look for potential changes that may occur with Wellbutrin. Poison control recommended use of benzos if patient becomes agitated or restless.   Private Auto Walk in